# Patient Record
Sex: FEMALE | Race: WHITE | Employment: UNEMPLOYED | URBAN - METROPOLITAN AREA
[De-identification: names, ages, dates, MRNs, and addresses within clinical notes are randomized per-mention and may not be internally consistent; named-entity substitution may affect disease eponyms.]

---

## 2020-08-26 ENCOUNTER — HOSPITAL ENCOUNTER (OUTPATIENT)
Dept: PREADMISSION TESTING | Age: 70
Discharge: HOME | End: 2020-08-26
Payer: MEDICARE

## 2020-08-26 DIAGNOSIS — Z01.812 PRE-PROCEDURE LAB EXAM: ICD-10-CM

## 2020-08-26 PROCEDURE — 87635 SARS-COV-2 COVID-19 AMP PRB: CPT

## 2020-08-28 LAB — SARS-COV-2, COV2NT: NOT DETECTED

## 2020-08-31 ENCOUNTER — HOSPITAL ENCOUNTER (OUTPATIENT)
Age: 70
Setting detail: OUTPATIENT SURGERY
Discharge: HOME | End: 2020-08-31
Attending: INTERNAL MEDICINE | Admitting: INTERNAL MEDICINE
Payer: MEDICARE

## 2020-08-31 ENCOUNTER — ANESTHESIA (OUTPATIENT)
Dept: ENDOSCOPY | Age: 70
End: 2020-08-31
Payer: MEDICARE

## 2020-08-31 ENCOUNTER — ANESTHESIA EVENT (OUTPATIENT)
Dept: ENDOSCOPY | Age: 70
End: 2020-08-31
Payer: MEDICARE

## 2020-08-31 VITALS
BODY MASS INDEX: 26.29 KG/M2 | WEIGHT: 154 LBS | RESPIRATION RATE: 17 BRPM | HEART RATE: 62 BPM | DIASTOLIC BLOOD PRESSURE: 59 MMHG | HEIGHT: 64 IN | SYSTOLIC BLOOD PRESSURE: 139 MMHG | TEMPERATURE: 98.4 F | OXYGEN SATURATION: 100 %

## 2020-08-31 PROCEDURE — 77030013992 HC SNR POLYP ENDOSC BSC -B: Performed by: INTERNAL MEDICINE

## 2020-08-31 PROCEDURE — 76060000032 HC ANESTHESIA 0.5 TO 1 HR: Performed by: INTERNAL MEDICINE

## 2020-08-31 PROCEDURE — 77030019988 HC FCPS ENDOSC DISP BSC -B: Performed by: INTERNAL MEDICINE

## 2020-08-31 PROCEDURE — 76040000007: Performed by: INTERNAL MEDICINE

## 2020-08-31 PROCEDURE — 74011250636 HC RX REV CODE- 250/636: Performed by: INTERNAL MEDICINE

## 2020-08-31 PROCEDURE — 77030003657 HC NDL SCLER BSC -B: Performed by: INTERNAL MEDICINE

## 2020-08-31 PROCEDURE — 74011250636 HC RX REV CODE- 250/636: Performed by: NURSE ANESTHETIST, CERTIFIED REGISTERED

## 2020-08-31 PROCEDURE — 77030010936 HC CLP LIG BSC -C: Performed by: INTERNAL MEDICINE

## 2020-08-31 PROCEDURE — 88305 TISSUE EXAM BY PATHOLOGIST: CPT

## 2020-08-31 PROCEDURE — 74011000250 HC RX REV CODE- 250: Performed by: NURSE ANESTHETIST, CERTIFIED REGISTERED

## 2020-08-31 RX ORDER — SODIUM CHLORIDE 9 MG/ML
150 INJECTION, SOLUTION INTRAVENOUS CONTINUOUS
Status: DISCONTINUED | OUTPATIENT
Start: 2020-08-31 | End: 2020-08-31 | Stop reason: HOSPADM

## 2020-08-31 RX ORDER — DEXTROMETHORPHAN/PSEUDOEPHED 2.5-7.5/.8
1.2 DROPS ORAL
Status: DISCONTINUED | OUTPATIENT
Start: 2020-08-31 | End: 2020-08-31 | Stop reason: HOSPADM

## 2020-08-31 RX ORDER — SODIUM CHLORIDE 9 MG/ML
INJECTION, SOLUTION INTRAVENOUS
Status: DISCONTINUED | OUTPATIENT
Start: 2020-08-31 | End: 2020-08-31 | Stop reason: HOSPADM

## 2020-08-31 RX ORDER — LIDOCAINE HYDROCHLORIDE 20 MG/ML
INJECTION, SOLUTION EPIDURAL; INFILTRATION; INTRACAUDAL; PERINEURAL AS NEEDED
Status: DISCONTINUED | OUTPATIENT
Start: 2020-08-31 | End: 2020-08-31 | Stop reason: HOSPADM

## 2020-08-31 RX ORDER — FENTANYL CITRATE 50 UG/ML
200 INJECTION, SOLUTION INTRAMUSCULAR; INTRAVENOUS
Status: DISCONTINUED | OUTPATIENT
Start: 2020-08-31 | End: 2020-08-31 | Stop reason: HOSPADM

## 2020-08-31 RX ORDER — EPINEPHRINE 0.1 MG/ML
1 INJECTION INTRACARDIAC; INTRAVENOUS
Status: DISCONTINUED | OUTPATIENT
Start: 2020-08-31 | End: 2020-08-31 | Stop reason: HOSPADM

## 2020-08-31 RX ORDER — MIDAZOLAM HYDROCHLORIDE 1 MG/ML
.25-5 INJECTION, SOLUTION INTRAMUSCULAR; INTRAVENOUS
Status: DISCONTINUED | OUTPATIENT
Start: 2020-08-31 | End: 2020-08-31 | Stop reason: HOSPADM

## 2020-08-31 RX ORDER — ATROPINE SULFATE 0.1 MG/ML
0.5 INJECTION INTRAVENOUS
Status: DISCONTINUED | OUTPATIENT
Start: 2020-08-31 | End: 2020-08-31 | Stop reason: HOSPADM

## 2020-08-31 RX ORDER — SODIUM CHLORIDE 0.9 % (FLUSH) 0.9 %
5-40 SYRINGE (ML) INJECTION AS NEEDED
Status: DISCONTINUED | OUTPATIENT
Start: 2020-08-31 | End: 2020-08-31 | Stop reason: HOSPADM

## 2020-08-31 RX ORDER — SODIUM CHLORIDE 0.9 % (FLUSH) 0.9 %
5-40 SYRINGE (ML) INJECTION EVERY 8 HOURS
Status: DISCONTINUED | OUTPATIENT
Start: 2020-08-31 | End: 2020-08-31 | Stop reason: HOSPADM

## 2020-08-31 RX ORDER — PROPOFOL 10 MG/ML
INJECTION, EMULSION INTRAVENOUS AS NEEDED
Status: DISCONTINUED | OUTPATIENT
Start: 2020-08-31 | End: 2020-08-31 | Stop reason: HOSPADM

## 2020-08-31 RX ADMIN — MIDAZOLAM HYDROCHLORIDE 2 MG: 1 INJECTION, SOLUTION INTRAMUSCULAR; INTRAVENOUS at 08:02

## 2020-08-31 RX ADMIN — PROPOFOL 20 MG: 10 INJECTION, EMULSION INTRAVENOUS at 08:09

## 2020-08-31 RX ADMIN — PROPOFOL 50 MG: 10 INJECTION, EMULSION INTRAVENOUS at 07:42

## 2020-08-31 RX ADMIN — LIDOCAINE HYDROCHLORIDE 40 MG: 20 INJECTION, SOLUTION EPIDURAL; INFILTRATION; INTRACAUDAL; PERINEURAL at 07:38

## 2020-08-31 RX ADMIN — PROPOFOL 50 MG: 10 INJECTION, EMULSION INTRAVENOUS at 07:46

## 2020-08-31 RX ADMIN — SODIUM CHLORIDE: 900 INJECTION, SOLUTION INTRAVENOUS at 07:37

## 2020-08-31 RX ADMIN — PROPOFOL 100 MG: 10 INJECTION, EMULSION INTRAVENOUS at 07:39

## 2020-08-31 NOTE — ANESTHESIA PREPROCEDURE EVALUATION
Relevant Problems   No relevant active problems       Anesthetic History     PONV          Review of Systems / Medical History  Patient summary reviewed, nursing notes reviewed and pertinent labs reviewed    Pulmonary    COPD: moderate            Comments: O2 @ 2L    Neuro/Psych   Within defined limits           Cardiovascular  Within defined limits                Exercise tolerance: <4 METS     GI/Hepatic/Renal     GERD           Endo/Other    Diabetes    Arthritis and cancer     Other Findings              Physical Exam    Airway  Mallampati: II  TM Distance: > 6 cm  Neck ROM: normal range of motion   Mouth opening: Normal     Cardiovascular  Regular rate and rhythm,  S1 and S2 normal,  no murmur, click, rub, or gallop             Dental    Dentition: Full upper dentures and Lower partial plate     Pulmonary  Breath sounds clear to auscultation               Abdominal  GI exam deferred       Other Findings            Anesthetic Plan    ASA: 3  Anesthesia type: MAC            Anesthetic plan and risks discussed with: Patient

## 2020-08-31 NOTE — DISCHARGE INSTRUCTIONS
Zane Marcus 912 Luz Hilario M.D.  [address] [place] [address]  [phone]          [address] [facility]  [id number]  1950    DISCOMFORT:  Redness at IV site- apply warm compress to area; if redness or soreness persist- contact your physician  There may be a slight amount of blood passed from the rectum  Gaseous discomfort- walking, belching will help relieve any discomfort  You may not operate a vehicle for 12 hours  You may not engage in an occupation involving machinery or appliances for the  rest of today  You may not drink alcoholic beverages for at least 12 hours  Avoid making any critical decisions for at least 24 hours    DIET:   You may resume your normal diet, but some patients find that heavy or large  meals may lead to indigestion or vomiting. I suggest a light meal as first food  intake. I recommend a whole food, plant-based diet for your overall health. ACTIVITY:  You may resume your normal daily activities. It is recommended that you spend the remainder of the day resting - avoid any strenuous activity. CALL M.D. IF ANY SIGN OF:   Increasing pain, nausea, vomiting  Abdominal distension (swelling)  Significant bleeding (oral or rectal)  Fever   Pain in chest area  Shortness of breath    Additional Instructions:   Call Dr. Luz Hilario if any questions or problems at [phone]   You should receive the biopsy results by phone or mail within 3 weeks, if not, call  my office for the results      Colonoscopy showed multiple polyps on the R side of the colon, one was large. Will see what biopsies show to determine removal. No NSAIDs for 2 days. Learning About Coronavirus [phone])  Coronavirus [phone]): Overview  What is coronavirus (COVID-19)? The coronavirus disease (COVID-19) is caused by a virus. It is an illness that was first found in [place], [place], in December 2019. It has since spread worldwide.   The virus can cause fever, cough, and trouble breathing. In severe cases, it can cause pneumonia and make it hard to breathe without help. It can cause death. Coronaviruses are a large group of viruses. They cause the common cold. They also cause more serious illnesses like Middle East respiratory syndrome (MERS) and severe acute respiratory syndrome (SARS). COVID-19 is caused by a novel coronavirus. That means it's a new type that has not been seen in people before. This virus spreads person-to-person through droplets from coughing and sneezing. It can also spread when you are close to someone who is infected. And it can spread when you touch something that has the virus on it, such as a doorknob or a tabletop. What can you do to protect yourself from coronavirus (COVID-19)? The best way to protect yourself from getting sick is to:  · Avoid areas where there is an outbreak. · Avoid contact with people who may be infected. · Wash your hands often with soap or alcohol-based hand sanitizers. · Avoid crowds and try to stay at least 6 feet away from other people. · Wash your hands often, especially after you cough or sneeze. Use soap and water, and scrub for at least 20 seconds. If soap and water aren't available, use an alcohol-based hand . · Avoid touching your mouth, nose, and eyes. What can you do to avoid spreading the virus to others? To help avoid spreading the virus to others:  · Cover your mouth with a tissue when you cough or sneeze. Then throw the tissue in the trash. · Use a disinfectant to clean things that you touch often. · Stay home if you are sick or have been exposed to the virus. Don't go to school, work, or public areas. And don't use public transportation. · If you are sick:  ? Leave your home only if you need to get medical care. But call the doctor's office first so they know you're coming.  And wear a face mask, if you have one.  ? If you have a face mask, wear it whenever you're around other people. It can help stop the spread of the virus when you cough or sneeze. ? Clean and disinfect your home every day. Use household  and disinfectant wipes or sprays. Take special care to clean things that you grab with your hands. These include doorknobs, remote controls, phones, and handles on your refrigerator and microwave. And don't forget countertops, tabletops, bathrooms, and computer keyboards. When to call for help  Call 911 anytime you think you may need emergency care. For example, call if:  · You have severe trouble breathing. (You can't talk at all.)  · You have constant chest pain or pressure. · You are severely dizzy or lightheaded. · You are confused or can't think clearly. · Your face and lips have a blue color. · You pass out (lose consciousness) or are very hard to wake up. Call your doctor now if you develop symptoms such as:  · Shortness of breath. · Fever. · Cough. If you need to get care, call ahead to the doctor's office for instructions before you go. Make sure you wear a face mask, if you have one, to prevent exposing other people to the virus. Where can you get the latest information? The following health organizations are tracking and studying this virus. Their websites contain the most up-to-date information. Frankie Castanon also learn what to do if you think you may have been exposed to the virus. · U.S. Centers for Disease Control and Prevention (CDC): The CDC provides updated news about the disease and travel advice. The website also tells you how to prevent the spread of infection. www.cdc.gov  · World Health Organization [facility]): WHO offers information about the virus outbreaks. WHO also has travel advice. www.who.int  Current as of: April 1, 2020               Content Version: 12.4  © 5214-3541 Healthwise, Incorporated.    Care instructions adapted under license by your healthcare professional. If you have questions about a medical condition or this instruction, always ask your healthcare professional. [place] [address] any warranty or liability for your use of this information. Patient Education        Colon Polyps: Care Instructions  Your Care Instructions     Colon polyps are growths in the colon or the rectum. The cause of most colon polyps is not known, and most people who get them do not have any problems. But a certain kind can turn into cancer. For this reason, regular testing for colon polyps is important for people as they get older. It is also important for anyone who has an increased risk for colon cancer. Polyps are usually found through routine colon cancer screening tests. Although most colon polyps are not cancerous, they are usually removed and then tested for cancer. Screening for colon cancer saves lives because the cancer can usually be cured if it is caught early. If you have a polyp that is the type that can turn into cancer, you may need more tests to examine your entire colon. The doctor will remove any other polyps that he or she finds, and you will be tested more often. Follow-up care is a key part of your treatment and safety. Be sure to make and go to all appointments, and call your doctor if you are having problems. It's also a good idea to know your test results and keep a list of the medicines you take. How can you care for yourself at home? Regular exams to look for colon polyps are the best way to prevent polyps from turning into colon cancer. These can include stool tests, sigmoidoscopy, colonoscopy, and CT colonography. Talk with your doctor about a testing schedule that is right for you. To prevent polyps  There is no home treatment that can prevent colon polyps. But these steps may help lower your risk for cancer. · Stay active. Being active can help you get to and stay at a healthy weight. Try to exercise on most days of the week. Walking is a good choice. · Eat well.  Choose a variety of vegetables, fruits, legumes (such as peas and beans), fish, poultry, and whole grains. · Do not smoke. If you need help quitting, talk to your doctor about stop-smoking programs and medicines. These can increase your chances of quitting for good. · If you drink alcohol, limit how much you drink. Limit alcohol to 2 drinks a day for men and 1 drink a day for women. When should you call for help? Call your doctor now or seek immediate medical care if:  · You have severe belly pain. · Your stools are maroon or very bloody. Watch closely for changes in your health, and be sure to contact your doctor if:  · You have a fever. · You have nausea or vomiting. · You have a change in bowel habits (new constipation or diarrhea). · Your symptoms get worse or are not improving as expected. Where can you learn more? Go to http://toñito-patricia.info/  Enter C571 in the search box to learn more about \"Colon Polyps: Care Instructions. \"  Current as of: August 22, 2019               Content Version: 12.5  © 1435-2982 Healthwise, Incorporated. Care instructions adapted under license by [employer] (which disclaims liability or warranty for this information). If you have questions about a medical condition or this instruction, always ask your healthcare professional. [address] any warranty or liability for your use of this information.

## 2020-08-31 NOTE — PROGRESS NOTES

## 2020-08-31 NOTE — H&P
[address], [address] [place] [address]          Pre-procedure History and Physical       NAME:  Kirstin Machuca   :   1950   MRN:   [id number]     CHIEF COMPLAINT/HPI: See procedure note    PMH:  Past Medical History:   Diagnosis Date    Arthritis     Asthma     Breast cancer, stage 2 ([facility] 2011    Cancer ([facility] 2004    BREAST CA left no sticks/bp in left arm    Chronic fatigue 2011    Chronic kidney disease     HEMATURIA    Chronic pain     JOINT PAIN AND LEFT CHEST PAIN    Chronic pain     possible fibromyalgia    Diabetes ([facility]     Dyspnea 2011    GERD (gastroesophageal reflux disease)     High cholesterol     Lupus ([facility] 2011    Nausea & vomiting     Neuropathy 2011    PMR (polymyalgia rheumatica) (HCC)     S/P chemotherapy, time since greater than 12 weeks     S/P radiation therapy > 12 wks ago     Unspecified adverse effect of anesthesia     SEVERE HYPERTENSION DURING ORAL SURGERY    Use of letrozole ([facility])     Vitamin d deficiency        PSH:  Past Surgical History:   Procedure Laterality Date    HX AMPUTATION      RIGHT INDEX FINGER    HX CHOLECYSTECTOMY      HX GI      COLONSOCPY/EGD    HX MASTECTOMY      LEFT AND LYMPH NODES    HX MODIFIED RADICAL MASTECTOMY  04    left, w/ sentinel, Dr. Yaneli Robles HX RENAL BIOPSY      HX UROLOGICAL      CYSTO/STENTS    UT LAP,LYMPH NODE BX  04    left axilla       Allergies:   Allergies   Allergen Reactions    Latex Other (comments)    Contrast Agent [Iodine] Anaphylaxis     X-ray dye  Pt states not allergy    Codeine Unknown (comments)    Demerol [Meperidine] Unknown (comments)    Januvia [Sitagliptin] Other (comments)     Severe chest pain    Phenergan [Promethazine] Anxiety    Talwin Compound Unknown (comments)       Home Medications:  Prior to Admission Medications   Prescriptions Last Dose Informant Patient Reported? Taking? CALCIUM CARBONATE (CALTRATE 600 PO) 8/30/2020 at Unknown time  Yes Yes   Sig: Take 1 Tab by mouth daily. LACTOBACILLUS ACIDOPHILUS (ACIDOPHOLUS PO) 8/30/2020 at Unknown time  Yes Yes   Sig: Take  by mouth nightly. LEXAPRO 20 mg tablet 8/30/2020 at Unknown time  Yes Yes   Sig: 10 mg nightly. PROAIR HFA 90 mcg/Actuation inhaler Unknown at Unknown time  Yes No   albuterol-ipratropium (DUONEB) 0.5 mg-3 mg(2.5 mg base)/3 mL nebulizer solution 8/30/2020 at Unknown time  Yes Yes   Sig: 3 mL by Nebulization route two (2) times daily as needed. esomeprazole (NEXIUM) 40 mg capsule Not Taking at Unknown time  Yes No   Sig: Take  by mouth daily. fentanyl (DURAGESIC) 75 mcg/hr Not Taking at Unknown time  Yes No   gabapentin enacarbil (HORIZANT) 600 mg Tb24 8/30/2020 at Unknown time  Yes Yes   Sig: Take 1 Tab by mouth nightly. metformin (GLUCOPHAGE) 500 mg tablet Not Taking at Unknown time  Yes No   Sig: Take 500 mg by mouth three (3) times daily (with meals). omega-3 fatty acids-vitamin e (FISH OIL) 1,000 mg cap Not Taking at Unknown time  Yes No   Sig: Take 1 Cap by mouth daily. sucralfate (CARAFATE) 100 mg/mL suspension Not Taking at Unknown time  Yes No   Sig: Take 2 tsp by mouth four (4) times daily. Indications: esophagitis   torsemide (DEMADEX) 10 mg tablet Not Taking at Unknown time  Yes No   Sig: Take 10 mg by mouth daily. Facility-Administered Medications: None       Hospital Medications:  No current facility-administered medications for this encounter.         Family History:  Family History   Problem Relation Age of Onset    Alcohol abuse Mother     Cancer Mother         LUNG CA, BREAST CA    Lung Disease Mother     Alcohol abuse Father     Cancer Father         STOMACH CA       Social History:  Social History     Tobacco Use    Smoking status: Current Every Day Smoker     Packs/day: 1.50     Years: 34.00     Pack years: 51.00   Substance Use Topics    Alcohol use: No       The patient was counseled at length about the risks of vaishali Covid-19 in the jackie-operative and post-operative states including the recovery window of their procedure. The patient was made aware that vaishali Covid-19 after a surgical procedure may worsen their prognosis for recovering from the virus and lend to a higher morbidity and or mortality risk. The patient was given the options of postponing their procedure. All of the risks, benefits, and alternatives were discussed. The patient does  wish to proceed with the procedure. PHYSICAL EXAM PRIOR TO SEDATION:  General: Alert, in no acute distress    Lungs:            CTA bilaterally  Heart:  Normal S1, S2    Abdomen: Soft, Non distended, Non tender. Normoactive bowel sounds. Assessment:   Stable for sedation administration.   Date of last colonoscopy: 2005, Polyps  No    Plan:     · Endoscopic procedure with sedation     Signed By: Madelyn Collado MD     8/31/2020  7:36 AM

## 2020-08-31 NOTE — ROUTINE PROCESS
Prasanth Najera 1950 
[id number] Situation: 
Verbal report received from: Mich Lerma Procedure: Procedure(s): 
COLONOSCOPY   :- 
COLON BIOPSY ENDOSCOPIC POLYPECTOMY RESOLUTION CLIP INJECTION Background: 
 
Preoperative diagnosis: ABNORMAL CT SCAN Postoperative diagnosis: 1. Internal Hemorrhoids 2. Colon Polyps :  Dr. Jhonny Young Assistant(s): Endoscopy Technician-1: Kwadwo Davis 
Endoscopy RN-1: Justina Bradley RN Specimens:  
ID Type Source Tests Collected by Time Destination 1 : Cecal Biopsy Preservative   Dolly Gonzales MD 8/31/2020 3285 Pathology 2 : Cecal Polyps Preservative   Dolly Gonzales MD 8/31/2020 1893 Pathology 3 : Ascending Colon Mass Biopsy Preservative   Dolly Gonzales MD 8/31/2020 5978 Pathology 4 : Ascending Colon Polyps Preservative   Dolly Gonzales MD 8/31/2020 4092 Pathology 5 : Transverse Colon Polyp Preservative   Dolly Gonzales MD 8/31/2020 1562 Pathology H. Pylori  no Assessment: 
Intra-procedure medications Anesthesia gave intra-procedure sedation and medications, see anesthesia flow sheet yes Intravenous fluids: NS@ [facility] Vital signs stable yes Abdominal assessment: round and soft yes Recommendation: 
Discharge patient per MD order yes . Return to floor na Family or Friend  fam 
Permission to share finding with family or friend yes

## 2020-08-31 NOTE — PROCEDURES
Zane Richardson 912 Xiomy Rubalcava M.D.  Angelica Cunha, [address]  [phone]               Colonoscopy Procedure Note    NAME: Bert Alex  :  1950  MRN:  [id number]    Indications: Abnormal CT scan, colon    : Dustin Lam MD    Referring Provider:  Rigo, MD Emilie    Surgical Assistant: none    Prosthetic devices, grafts, tissues, transplant, or devices implanted: none    Medicines:  MAC anesthesia      Procedure Details:  After informed consent was obtained with all risks and benefits of the procedure explained and preprocedure exam completed, the patient was placed in the left lateral decubitus position. Universal protocol for patient identification was performed and documented in the nursing notes. Throughout the procedure, the patient's blood pressure was monitored at least every five minutes; pulse, and oxygen saturations were monitored continuously. All vital signs were documented in the nursing notes. A digital rectal exam was performed and was normal.  The Olympus videocolonoscope  was inserted in the rectum and carefully advanced to the terminal ileum. The colonoscope was slowly withdrawn with careful evaluation between folds. Retroflexion in the rectum was performed; findings and interventions are described below. Procedure start time, extent reached time/cecum time, and procedure end time are documented in the nursing notes. The quality of preparation was good. Findings:   Rectum: Grade small internal hemorrhoid(s);   Sigmoid: normal  Descending Colon: normal  Transverse Colon: 5 mm sessile polyp s/p cold snare polypectomy  Ascending Colon: two diminutive polyps s/p cold snare polypectomy; one 15 mm pedunculated polyp s/p hot snare polypectomy and placement of 2 hemoclips; one 25 mm sessile polyp in the proximal ascending colon s/p biopsies and makayla ink tattoo on the contralateral side  Cecum: two sessile polyps with greatest diameter of 4 mm s/p cold forceps polypectomy; biopsies taken of the normal appearing mucosa  Terminal Ileum: normal    Interventions:    6 complete polypectomy were performed using hot snare  /cold snare/cold forceps and the polyps were  retrieved    Specimens:   ID Type Source Tests Collected by Time Destination   1 : Cecal Biopsy Preservative   Adalgisa Cevallos MD 8/31/2020 5725 Pathology   2 : Cecal Polyps Preservative   Adalgisa Cevallos MD 8/31/2020 3585 Pathology   3 : Ascending Colon Mass Biopsy Preservative   Adalgisa Cevallos MD 8/31/2020 0759 Pathology   4 : Ascending Colon Polyps Preservative   Adalgisa Cevallos MD 8/31/2020 0750 Pathology   5 : Transverse Colon Polyp Preservative   Adalgisa Cevallos MD 8/31/2020 0764 Pathology       EBL:  None. Complications:   No immediate complications     Impression:  -See post-procedure diagnoses. Recommendations:   -Await pathology. If < 10 years, reason:  above average risk patient    -Based on pathology of ascending colon lesion, will determine need for EMR  NO NSAIDs for 2 days       Signed by:  Brianna Delaney MD          8/31/2020  8:22 AM

## 2020-08-31 NOTE — ANESTHESIA POSTPROCEDURE EVALUATION
Post-Anesthesia Evaluation and Assessment    Patient: Jamie Benoit MRN: [id number]  SSN: [id number]    YOB: 1950  Age: 72 y.o. Sex: female       Cardiovascular Function/Vital Signs  Visit Vitals  /57   Pulse (!) 59   Temp 36.9 °C (98.4 °F)   Resp 24   Ht 5' 4\" (1.626 m)   Wt 69.9 kg (154 lb)   SpO2 100%   Breastfeeding No   BMI 26.43 kg/m²       Patient is status post MAC anesthesia for Procedure(s):  COLONOSCOPY   :-  COLON BIOPSY  ENDOSCOPIC POLYPECTOMY  RESOLUTION CLIP  INJECTION. Nausea/Vomiting: None    Postoperative hydration reviewed and adequate. Pain:  Pain Scale 1: Numeric (0 - 10) (08/31/20 0839)  Pain Intensity 1: 0 (08/31/20 0839)   Managed    Neurological Status: At baseline    Mental Status and Level of Consciousness: Alert and oriented to person, place, and time    Pulmonary Status:   O2 Device: Nasal cannula (08/31/20 0839)   Adequate oxygenation and airway patent    Complications related to anesthesia: None    Post-anesthesia assessment completed. No concerns    Signed By: Sol Schofield MD     August 31, 2020              Procedure(s):  COLONOSCOPY   :-  COLON BIOPSY  ENDOSCOPIC POLYPECTOMY  RESOLUTION CLIP  INJECTION. MAC    <BSHSIANPOST>    INITIAL Post-op Vital signs:   Vitals Value Taken Time   /57 8/31/2020  8:39 AM   Temp     Pulse 62 8/31/2020  8:42 AM   Resp 19 8/31/2020  8:42 AM   SpO2 100 % 8/31/2020  8:42 AM   Vitals shown include unvalidated device data.

## 2020-10-15 ENCOUNTER — HOSPITAL ENCOUNTER (OUTPATIENT)
Age: 70
Setting detail: OUTPATIENT SURGERY
Discharge: HOME | End: 2020-10-15
Attending: INTERNAL MEDICINE | Admitting: INTERNAL MEDICINE
Payer: MEDICARE

## 2020-10-15 ENCOUNTER — ANESTHESIA EVENT (OUTPATIENT)
Dept: ENDOSCOPY | Age: 70
End: 2020-10-15
Payer: MEDICARE

## 2020-10-15 ENCOUNTER — ANESTHESIA (OUTPATIENT)
Dept: ENDOSCOPY | Age: 70
End: 2020-10-15
Payer: MEDICARE

## 2020-10-15 VITALS
SYSTOLIC BLOOD PRESSURE: 106 MMHG | HEIGHT: 64 IN | RESPIRATION RATE: 20 BRPM | TEMPERATURE: 98.4 F | OXYGEN SATURATION: 98 % | BODY MASS INDEX: 25.78 KG/M2 | WEIGHT: 151 LBS | DIASTOLIC BLOOD PRESSURE: 54 MMHG | HEART RATE: 62 BPM

## 2020-10-15 PROCEDURE — 74011250636 HC RX REV CODE- 250/636: Performed by: NURSE ANESTHETIST, CERTIFIED REGISTERED

## 2020-10-15 PROCEDURE — 77030040362: Performed by: INTERNAL MEDICINE

## 2020-10-15 PROCEDURE — 76040000019: Performed by: INTERNAL MEDICINE

## 2020-10-15 PROCEDURE — 76060000031 HC ANESTHESIA FIRST 0.5 HR: Performed by: INTERNAL MEDICINE

## 2020-10-15 PROCEDURE — 74011250637 HC RX REV CODE- 250/637: Performed by: INTERNAL MEDICINE

## 2020-10-15 PROCEDURE — 2709999900 HC NON-CHARGEABLE SUPPLY: Performed by: INTERNAL MEDICINE

## 2020-10-15 PROCEDURE — 74011000250 HC RX REV CODE- 250: Performed by: NURSE ANESTHETIST, CERTIFIED REGISTERED

## 2020-10-15 PROCEDURE — 77030040684 HC NDL ENDOSC NEEDLEMASTR OCOA -B: Performed by: INTERNAL MEDICINE

## 2020-10-15 RX ORDER — SODIUM CHLORIDE 0.9 % (FLUSH) 0.9 %
5-40 SYRINGE (ML) INJECTION AS NEEDED
Status: DISCONTINUED | OUTPATIENT
Start: 2020-10-15 | End: 2020-10-15 | Stop reason: HOSPADM

## 2020-10-15 RX ORDER — FLUMAZENIL 0.1 MG/ML
0.2 INJECTION INTRAVENOUS
Status: DISCONTINUED | OUTPATIENT
Start: 2020-10-15 | End: 2020-10-15 | Stop reason: HOSPADM

## 2020-10-15 RX ORDER — ATROPINE SULFATE 0.1 MG/ML
0.5 INJECTION INTRAVENOUS
Status: DISCONTINUED | OUTPATIENT
Start: 2020-10-15 | End: 2020-10-15 | Stop reason: HOSPADM

## 2020-10-15 RX ORDER — EPINEPHRINE 0.1 MG/ML
1 INJECTION INTRACARDIAC; INTRAVENOUS
Status: DISCONTINUED | OUTPATIENT
Start: 2020-10-15 | End: 2020-10-15 | Stop reason: HOSPADM

## 2020-10-15 RX ORDER — LIDOCAINE HYDROCHLORIDE 20 MG/ML
INJECTION, SOLUTION EPIDURAL; INFILTRATION; INTRACAUDAL; PERINEURAL AS NEEDED
Status: DISCONTINUED | OUTPATIENT
Start: 2020-10-15 | End: 2020-10-15 | Stop reason: HOSPADM

## 2020-10-15 RX ORDER — DEXTROMETHORPHAN/PSEUDOEPHED 2.5-7.5/.8
1.2 DROPS ORAL
Status: DISCONTINUED | OUTPATIENT
Start: 2020-10-15 | End: 2020-10-15 | Stop reason: HOSPADM

## 2020-10-15 RX ORDER — PHENOL/SODIUM PHENOLATE
20 AEROSOL, SPRAY (ML) MUCOUS MEMBRANE DAILY
COMMUNITY
End: 2020-11-09

## 2020-10-15 RX ORDER — NALOXONE HYDROCHLORIDE 0.4 MG/ML
0.4 INJECTION, SOLUTION INTRAMUSCULAR; INTRAVENOUS; SUBCUTANEOUS
Status: DISCONTINUED | OUTPATIENT
Start: 2020-10-15 | End: 2020-10-15 | Stop reason: HOSPADM

## 2020-10-15 RX ORDER — SODIUM CHLORIDE 0.9 % (FLUSH) 0.9 %
5-40 SYRINGE (ML) INJECTION EVERY 8 HOURS
Status: DISCONTINUED | OUTPATIENT
Start: 2020-10-15 | End: 2020-10-15 | Stop reason: HOSPADM

## 2020-10-15 RX ORDER — SODIUM CHLORIDE 9 MG/ML
50 INJECTION, SOLUTION INTRAVENOUS CONTINUOUS
Status: DISCONTINUED | OUTPATIENT
Start: 2020-10-15 | End: 2020-10-15 | Stop reason: HOSPADM

## 2020-10-15 RX ORDER — MIDAZOLAM HYDROCHLORIDE 1 MG/ML
.25-5 INJECTION, SOLUTION INTRAMUSCULAR; INTRAVENOUS
Status: DISCONTINUED | OUTPATIENT
Start: 2020-10-15 | End: 2020-10-15 | Stop reason: HOSPADM

## 2020-10-15 RX ORDER — FENTANYL CITRATE 50 UG/ML
25-200 INJECTION, SOLUTION INTRAMUSCULAR; INTRAVENOUS
Status: DISCONTINUED | OUTPATIENT
Start: 2020-10-15 | End: 2020-10-15 | Stop reason: HOSPADM

## 2020-10-15 RX ORDER — PROPOFOL 10 MG/ML
INJECTION, EMULSION INTRAVENOUS AS NEEDED
Status: DISCONTINUED | OUTPATIENT
Start: 2020-10-15 | End: 2020-10-15 | Stop reason: HOSPADM

## 2020-10-15 RX ORDER — SODIUM CHLORIDE 9 MG/ML
INJECTION, SOLUTION INTRAVENOUS
Status: DISCONTINUED | OUTPATIENT
Start: 2020-10-15 | End: 2020-10-15 | Stop reason: HOSPADM

## 2020-10-15 RX ADMIN — PROPOFOL 50 MG: 10 INJECTION, EMULSION INTRAVENOUS at 12:21

## 2020-10-15 RX ADMIN — LIDOCAINE HYDROCHLORIDE 40 MG: 20 INJECTION, SOLUTION EPIDURAL; INFILTRATION; INTRACAUDAL; PERINEURAL at 12:15

## 2020-10-15 RX ADMIN — PROPOFOL 30 MG: 10 INJECTION, EMULSION INTRAVENOUS at 12:25

## 2020-10-15 RX ADMIN — PROPOFOL 50 MG: 10 INJECTION, EMULSION INTRAVENOUS at 12:15

## 2020-10-15 RX ADMIN — SODIUM CHLORIDE: 900 INJECTION, SOLUTION INTRAVENOUS at 11:50

## 2020-10-15 RX ADMIN — PROPOFOL 50 MG: 10 INJECTION, EMULSION INTRAVENOUS at 12:17

## 2020-10-15 NOTE — H&P
[address]  174 [facility], [address] [place] [address]      History and Physical       NAME:  Yasmin Wills   :   1950   MRN:   [id number]             History of Present Illness:  Patient is a 72 y.o. who is seen for ascending colon polyp and removal with EMR. PMH:  Past Medical History:   Diagnosis Date    Arthritis     Asthma     Breast cancer, stage 2 ([facility] 2011    Cancer ([facility] 2004    BREAST CA left no sticks/bp in left arm    Chronic fatigue 2011    Chronic kidney disease     HEMATURIA    Chronic pain     JOINT PAIN AND LEFT CHEST PAIN    Chronic pain     possible fibromyalgia    [facility]     Dyspnea 2011    GERD (gastroesophageal reflux disease)     High cholesterol     Lupus [facility] 2011    Nausea & vomiting     Neuropathy 2011    PMR (polymyalgia rheumatica) (HCC)     S/P chemotherapy, time since greater than 12 weeks     S/P radiation therapy > 12 wks ago     Unspecified adverse effect of anesthesia     SEVERE HYPERTENSION DURING ORAL SURGERY    Use of letrozole (Femara)     Vitamin D deficiency        PSH:  Past Surgical History:   Procedure Laterality Date    COLONOSCOPY N/A 2020    COLONOSCOPY   :- performed by Anatoliy Rosario [facility] at [facility] ENDOSCOPY    HX AMPUTATION      RIGHT INDEX FINGER    HX CHOLECYSTECTOMY      HX GI      COLONSOCPY/EGD    HX MASTECTOMY      LEFT AND LYMPH NODES    HX MODIFIED RADICAL MASTECTOMY  04    left, w/ sentinel, Dr. Dany Callahan    HX RENAL BIOPSY      HX UROLOGICAL      CYSTO/STENTS    NE LAP,LYMPH NODE BX  04    left axilla       Allergies:   Allergies   Allergen Reactions    Latex Other (comments)    Contrast Agent [Iodine] Anaphylaxis     X-ray dye  Pt states not allergy    Codeine Unknown (comments)    Demerol [Meperidine] Unknown (comments)    Januvia [Sitagliptin] Other (comments)     Severe chest pain    Phenergan [Promethazine] Anxiety    Talwin Compound Unknown (comments)       Home Medications:  Prior to Admission Medications   Prescriptions Last Dose Informant Patient Reported? Taking? CALCIUM CARBONATE (CALTRATE 600 PO) Not Taking at Unknown time  Yes No   Sig: Take 1 Tab by mouth daily. LACTOBACILLUS ACIDOPHILUS (ACIDOPHOLUS PO) Not Taking at Unknown time  Yes No   Sig: Take  by mouth nightly. LEXAPRO 20 mg tablet 10/8/2020 at Unknown time  Yes Yes   Sig: 10 mg nightly. Omeprazole delayed release (PRILOSEC D/R) 20 mg tablet 10/8/2020 at Unknown time  Yes Yes   Sig: Take 20 mg by mouth daily. PROAIR HFA 90 mcg/Actuation inhaler 10/14/2020 at Unknown time  Yes Yes   albuterol-ipratropium (DUONEB) 0.5 mg-3 mg(2.5 mg base)/3 mL nebulizer solution 9/15/2020 at Unknown time  Yes Yes   Sig: 3 mL by Nebulization route two (2) times daily as needed. budesonide/formoterol fumarate (SYMBICORT IN) 10/15/2020 at Unknown time  Yes Yes   Sig: Take  by inhalation. esomeprazole (NEXIUM) 40 mg capsule Not Taking at Unknown time  Yes No   Sig: Take  by mouth daily. fentanyl (DURAGESIC) 75 mcg/hr Not Taking at Unknown time  Yes No   gabapentin enacarbil (HORIZANT) 600 mg Tb24 10/8/2020 at Unknown time  Yes Yes   Sig: Take 1 Tab by mouth nightly. metformin (GLUCOPHAGE) 500 mg tablet Not Taking at Unknown time  Yes No   Sig: Take 500 mg by mouth three (3) times daily (with meals). omega-3 fatty acids-vitamin e (FISH OIL) 1,000 mg cap Not Taking at Unknown time  Yes No   Sig: Take 1 Cap by mouth daily. sucralfate (CARAFATE) 100 mg/mL suspension Not Taking at Unknown time  Yes No   Sig: Take 2 tsp by mouth four (4) times daily. Indications: esophagitis   torsemide (DEMADEX) 10 mg tablet Not Taking at Unknown time  Yes No   Sig: Take 10 mg by mouth daily. umeclidinium bromide (INCRUSE ELLIPTA IN) 10/15/2020 at Unknown time  Yes Yes   Sig: Take  by inhalation.       Facility-Administered Medications: None       Hospital Medications:  Current Facility-Administered Medications   Medication Dose Route Frequency    0.9% sodium chloride infusion  50 mL/hr IntraVENous CONTINUOUS    sodium chloride (NS) flush 5-40 mL  5-40 mL IntraVENous Q8H    sodium chloride (NS) flush 5-40 mL  5-40 mL IntraVENous PRN    midazolam (VERSED) injection 0.25-5 mg  0.25-5 mg IntraVENous Multiple    fentaNYL citrate (PF) injection  mcg   mcg IntraVENous Multiple    naloxone (NARCAN) injection 0.4 mg  0.4 mg IntraVENous Multiple    flumazeniL (ROMAZICON) 0.1 mg/mL injection 0.2 mg  0.2 mg IntraVENous Multiple    simethicone (MYLICON) 17QL/8.5QC oral drops 80 mg  1.2 mL Oral Multiple    atropine injection 0.5 mg  0.5 mg IntraVENous ONCE PRN    EPINEPHrine (ADRENALIN) 0.1 mg/mL syringe 1 mg  1 mg Endoscopically ONCE PRN       Social History:  Social History     Tobacco Use    Smoking status: Current Every Day Smoker     Packs/day: 1.50     Years: 34.00     Pack years: 51.00   Substance Use Topics    Alcohol use: No       Family History:  Family History   Problem Relation Age of Onset    Alcohol abuse Mother     Cancer Mother         LUNG CA, BREAST CA    Lung Disease Mother     Alcohol abuse Father     Cancer Father         STOMACH CA             Review of Systems:      Constitutional: negative fever, negative chills, negative weight loss  Eyes:   negative visual changes  ENT:   negative sore throat, tongue or lip swelling  Respiratory:  negative cough, negative dyspnea  Cards:  negative for chest pain, palpitations, lower extremity edema  GI:   See HPI  :  negative for frequency, dysuria  Integument:  negative for rash and pruritus  Heme:  negative for easy bruising and gum/nose bleeding  Musculoskel: negative for myalgias,  back pain and muscle weakness  Neuro: negative for headaches, dizziness, vertigo  Psych:  negative for feelings of anxiety, depression       Objective:     Patient Vitals for the past 8 hrs:   BP Temp Pulse Resp SpO2 Height Weight   10/15/20 1104 (!) 114/54 98.8 °F (37.1 °C) 69 20 99 % 5' 4\" (1.626 m) 68.5 kg (151 lb)     No intake/output data recorded. No intake/output data recorded. EXAM:     NEURO-a&o   HEENT-wnl   LUNGS-clear    COR-regular rate and rhythym     ABD-soft , no tenderness, no rebound, good bs     EXT-no edema     Data Review     No results for input(s): WBC, HGB, HCT, PLT, HGBEXT, HCTEXT, PLTEXT in the last 72 hours. No results for input(s): NA, K, CL, CO2, BUN, CREA, GLU, PHOS, CA in the last 72 hours. No results for input(s): AP, TBIL, TP, ALB, GLOB, GGT, AML, LPSE in the last 72 hours. No lab exists for component: SGOT, GPT, AMYP, HLPSE  No results for input(s): INR, PTP, APTT, INREXT in the last 72 hours. Assessment:     · Ascending colon polyp     Patient Active Problem List   Diagnosis Code    [facility] C50.919    Neuropathy G62.9    Chronic fatigue R53.82    Dyspnea R06.00    Lupus ([facility] M32.9    Post-op pain G89.18     Plan:   · The patient was counseled at length about the risks of vaishali Covid-19 in the jackie-operative and post-operative states including the recovery window of their procedure. The patient was made aware that vaishali Covid-19 after a surgical procedure may worsen their prognosis for recovering from the virus and lend to a higher morbidity and or mortality risk. The patient was given the options of postponing their procedure. All of the risks, benefits, and alternatives were discussed. The patient does wish to proceed with the procedure. · Endoscopic procedure with MAC     Signed By: Chiara Wagner.  Emir Herbert MD     10/15/2020  12:05 PM

## 2020-10-15 NOTE — ROUTINE PROCESS
Yasmin [facility] 1950 
[id number] Situation: 
Verbal report received from: DANA Rogers Procedure: Procedure(s): 
COLONOSCOPY with EMR INJECTION Background: 
 
Preoperative diagnosis: Colon polyp Postoperative diagnosis: Ascending Colon Polyp :  Dr. Ekaterina Cochran Assistant(s): Endoscopy Technician-1: Chana Rahman Endoscopy RN-1: Crissy Cortes RN Specimens: * No specimens in log * H. Pylori  no Assessment: 
Intra-procedure medications Anesthesia gave intra-procedure sedation and medications, see anesthesia flow sheet yes Intravenous fluids: NS@ Marissa Clore Vital signs stable Abdominal assessment: round and soft Recommendation: 
Discharge patient per MD order. Family or Friend Permission to share finding with family or friend yes

## 2020-10-15 NOTE — PROCEDURES
26[address] [facility]  174 [facility], [address] [place] [address]      Colonoscopy Operative Report    Finn Steward  [id number]  1950      Procedure Type:   Colonoscopy with tattoo placement     Indications:  Ascending colon polyp        Pre-operative Diagnosis: see indication above    Post-operative Diagnosis:  See findings below    :  Fransico Narayanan. Dagmar Graff MD      Referring Provider: MIREYA Gong MD    Sedation:  MAC anesthesia Propofol      Procedure Details:  After informed consent was obtained with all risks and benefits of procedure explained and preoperative exam completed, the patient was taken to the endoscopy suite and placed in the left lateral decubitus position. Upon sequential sedation as per above, a digital rectal exam was performed demonstrating internal hemorrhoids. The Olympus pediatric videocolonoscope  was inserted in the rectum and carefully advanced to the cecum, which was identified by the ileocecal valve and appendiceal orifice. The cecum was identified by the ileocecal valve and appendiceal orifice. The quality of preparation was good. The colonoscope was slowly withdrawn with careful evaluation between folds. Retroflexion in the rectum was completed . Findings:   The previously described ascending colon polyp was identified. Attempts were made to lift the polyp with ORISE gel lifting agent. There was clearly a centrally depressed region that would not lift. For this reason, EMR was deferred today. Additional submucosal tattoo was placed distal to the polyp. The polyp had been previously biopsied. Specimen Removed: none    Complications: None. EBL:  None. Impression:    1. Ascending colon polyp - could not be removed with EMR as described above    Recommendations:  1. Referral to surgery  2. Will discuss findings with Dr. Arti Brantley By: Fransico Narayanan.  Dagmar Graff MD     10/15/2020  12:34 PM

## 2020-10-15 NOTE — DISCHARGE INSTRUCTIONS
[address] [facility]    COLON DISCHARGE INSTRUCTIONS    Alston Libman  [id number]  1950    Discomfort:  Redness at IV site- apply warm compress to area; if redness or soreness persist- contact your physician  There may be a slight amount of blood passed from the rectum  Gaseous discomfort- walking, belching will help relieve any discomfort  You may not operate a vehicle for 12 hours  You may not engage in an occupation involving machinery or appliances for rest of today  You may not drink alcoholic beverages for at least 12 hours  Avoid making any critical decisions for at least 24 hour  DIET:  You may resume your regular diet - however -  remember your colon is empty and a heavy meal will produce gas. Avoid these foods:  vegetables, fried / greasy foods, carbonated drinks     ACTIVITY:  You may  resume your normal daily activities it is recommended that you spend the remainder of the day resting -  avoid any strenuous activity. CALL M.D. ANY SIGN OF:   Increasing pain, nausea, vomiting  Abdominal distension (swelling)  New increased bleeding (oral or rectal)  Fever (chills)  Pain in chest area  Bloody discharge from nose or mouth  Shortness of breath      Follow-up Instructions:   Call Dr. Zuleima Wahl for any questions or problems at [phone]          ENDOSCOPY FINDINGS:   Your colonoscopy showed a large polyp in the colon as previously described by Dr. Kristopher Stephen. This could not be removed through the scope today and will require surgery to remove. I will discuss these findings with Dr. Kristopher Stephen and he will set you up to see a surgeon. Telephone # [phone]      Signed By: Conrad Karimi.  Jess Lee MD     10/15/2020  12:33 PM       DISCHARGE SUMMARY from Nurse    The following personal items collected during your admission are returned to you:   Dental Appliance: Dental Appliances: Uppers, Lowers  Vision: Visual Aid: Glasses  Hearing Aid:    Anais Specking: Clothing:    Other Valuables:    Valuables sent to safe:      Learning About Coronavirus (COVID-19)  Coronavirus [phone]): Overview  What is coronavirus (COVID-19)? The coronavirus disease (COVID-19) is caused by a virus. It is an illness that was first found in [place], [place], in December 2019. It has since spread worldwide. The virus can cause fever, cough, and trouble breathing. In severe cases, it can cause pneumonia and make it hard to breathe without help. It can cause death. Coronaviruses are a large group of viruses. They cause the common cold. They also cause more serious illnesses like Middle East respiratory syndrome (MERS) and severe acute respiratory syndrome (SARS). COVID-19 is caused by a novel coronavirus. That means it's a new type that has not been seen in people before. This virus spreads person-to-person through droplets from coughing and sneezing. It can also spread when you are close to someone who is infected. And it can spread when you touch something that has the virus on it, such as a doorknob or a tabletop. What can you do to protect yourself from coronavirus (COVID-19)? The best way to protect yourself from getting sick is to:  · Avoid areas where there is an outbreak. · Avoid contact with people who may be infected. · Wash your hands often with soap or alcohol-based hand sanitizers. · Avoid crowds and try to stay at least 6 feet away from other people. · Wash your hands often, especially after you cough or sneeze. Use soap and water, and scrub for at least 20 seconds. If soap and water aren't available, use an alcohol-based hand . · Avoid touching your mouth, nose, and eyes. What can you do to avoid spreading the virus to others? To help avoid spreading the virus to others:  · Cover your mouth with a tissue when you cough or sneeze. Then throw the tissue in the trash. · Use a disinfectant to clean things that you touch often.   · Stay home if you are sick or have been exposed to the virus. Don't go to school, work, or public areas. And don't use public transportation. · If you are sick:  ? Leave your home only if you need to get medical care. But call the doctor's office first so they know you're coming. And wear a face mask, if you have one.  ? If you have a face mask, wear it whenever you're around other people. It can help stop the spread of the virus when you cough or sneeze. ? Clean and disinfect your home every day. Use household  and disinfectant wipes or sprays. Take special care to clean things that you grab with your hands. These include doorknobs, remote controls, phones, and handles on your refrigerator and microwave. And don't forget countertops, tabletops, bathrooms, and computer keyboards. When to call for help  Call 911 anytime you think you may need emergency care. For example, call if:  · You have severe trouble breathing. (You can't talk at all.)  · You have constant chest pain or pressure. · You are severely dizzy or lightheaded. · You are confused or can't think clearly. · Your face and lips have a blue color. · You pass out (lose consciousness) or are very hard to wake up. Call your doctor now if you develop symptoms such as:  · Shortness of breath. · Fever. · Cough. If you need to get care, call ahead to the doctor's office for instructions before you go. Make sure you wear a face mask, if you have one, to prevent exposing other people to the virus. Where can you get the latest information? The following health organizations are tracking and studying this virus. Their websites contain the most up-to-date information. Mulugeta Peoples also learn what to do if you think you may have been exposed to the virus. · U.S. Centers for Disease Control and Prevention (CDC): The CDC provides updated news about the disease and travel advice. The website also tells you how to prevent the spread of infection.  www.cdc.gov  · World Health Organization [facility]): WHO offers information about the virus outbreaks. WHO also has travel advice. www.who.int  Current as of: April 1, 2020               Content Version: 12.4  © 2297-2785 [employer], [employer]orporated. Care instructions adapted under license by your healthcare professional. If you have questions about a medical condition or this instruction, always ask your healthcare professional. [address] any warranty or liability for your use of this information.

## 2020-10-15 NOTE — ANESTHESIA POSTPROCEDURE EVALUATION
Post-Anesthesia Evaluation and Assessment    Patient: Fatemeh Monk MRN: [id number]  SSN: [id number]    YOB: 1950  Age: 72 y.o. Sex: female      I have evaluated the patient and they are stable and ready for discharge from the PACU. Cardiovascular Function/Vital Signs  Visit Vitals  BP (!) 104/57   Pulse 65   Temp 37.1 °C (98.8 °F)   Resp 20   Ht 5' 4\" (1.626 m)   Wt 68.5 kg (151 lb)   SpO2 97%   BMI 25.92 kg/m²       Patient is status post MAC anesthesia for Procedure(s):  COLONOSCOPY with EMR  INJECTION. Nausea/Vomiting: None    Postoperative hydration reviewed and adequate. Pain:  Pain Scale 1: Numeric (0 - 10) (10/15/20 1239)  Pain Intensity 1: 0 (10/15/20 1239)   Managed    Neurological Status: At baseline    Mental Status, Level of Consciousness: Alert and  oriented to person, place, and time    Pulmonary Status:   O2 Device: Room air (10/15/20 1239)   Adequate oxygenation and airway patent    Complications related to anesthesia: None    Post-anesthesia assessment completed. No concerns    Signed By: Mahi Elliott MD     October 15, 2020              Procedure(s):  COLONOSCOPY with EMR  INJECTION. MAC    <BSHSIANPOST>    INITIAL Post-op Vital signs:   Vitals Value Taken Time   BP 0/0 10/15/2020 12:45 PM   Temp     Pulse 65 10/15/2020 12:45 PM   Resp 28 10/15/2020 12:45 PM   SpO2 97 % 10/15/2020 12:45 PM   Vitals shown include unvalidated device data.

## 2020-10-15 NOTE — PERIOP NOTES

## 2020-11-02 ENCOUNTER — TRANSCRIBE ORDER (OUTPATIENT)
Dept: GENERAL RADIOLOGY | Age: 70
End: 2020-11-02

## 2020-11-02 ENCOUNTER — HOSPITAL ENCOUNTER (OUTPATIENT)
Dept: GENERAL RADIOLOGY | Age: 70
Discharge: HOME | End: 2020-11-02
Attending: INTERNAL MEDICINE
Payer: MEDICARE

## 2020-11-02 DIAGNOSIS — C34.90 LUNG CANCER (HCC): Primary | ICD-10-CM

## 2020-11-02 DIAGNOSIS — C34.90 LUNG CANCER (HCC): ICD-10-CM

## 2020-11-02 PROCEDURE — 71101 X-RAY EXAM UNILAT RIBS/CHEST: CPT

## 2020-11-04 ENCOUNTER — OFFICE VISIT (OUTPATIENT)
Dept: SURGERY | Age: 70
End: 2020-11-04
Payer: MEDICARE

## 2020-11-04 VITALS
RESPIRATION RATE: 20 BRPM | BODY MASS INDEX: 26.46 KG/M2 | DIASTOLIC BLOOD PRESSURE: 55 MMHG | TEMPERATURE: 99.3 F | HEART RATE: 67 BPM | HEIGHT: 64 IN | OXYGEN SATURATION: 97 % | WEIGHT: 155 LBS | SYSTOLIC BLOOD PRESSURE: 111 MMHG

## 2020-11-04 DIAGNOSIS — K63.89 MASS OF COLON: Primary | ICD-10-CM

## 2020-11-04 PROBLEM — C34.12 MALIGNANT NEOPLASM OF UPPER LOBE OF LEFT LUNG (HCC): Status: ACTIVE | Noted: 2020-11-04

## 2020-11-04 PROBLEM — K21.9 GASTROESOPHAGEAL REFLUX DISEASE WITHOUT ESOPHAGITIS: Status: ACTIVE | Noted: 2020-11-04

## 2020-11-04 PROBLEM — E11.9 CONTROLLED TYPE 2 DIABETES MELLITUS WITHOUT COMPLICATION, WITHOUT LONG-TERM CURRENT USE OF INSULIN (HCC): Status: ACTIVE | Noted: 2020-11-04

## 2020-11-04 PROBLEM — J41.0 SIMPLE CHRONIC BRONCHITIS (HCC): Status: ACTIVE | Noted: 2020-11-04

## 2020-11-04 PROBLEM — I10 ESSENTIAL HYPERTENSION: Status: ACTIVE | Noted: 2020-11-04

## 2020-11-04 PROCEDURE — 3017F COLORECTAL CA SCREEN DOC REV: CPT | Performed by: SURGERY

## 2020-11-04 PROCEDURE — G8510 SCR DEP NEG, NO PLAN REQD: HCPCS | Performed by: SURGERY

## 2020-11-04 PROCEDURE — G8427 DOCREV CUR MEDS BY ELIG CLIN: HCPCS | Performed by: SURGERY

## 2020-11-04 PROCEDURE — 99203 OFFICE O/P NEW LOW 30 MIN: CPT | Performed by: SURGERY

## 2020-11-04 PROCEDURE — 3288F FALL RISK ASSESSMENT DOCD: CPT | Performed by: SURGERY

## 2020-11-04 PROCEDURE — G8400 PT W/DXA NO RESULTS DOC: HCPCS | Performed by: SURGERY

## 2020-11-04 PROCEDURE — 1090F PRES/ABSN URINE INCON ASSESS: CPT | Performed by: SURGERY

## 2020-11-04 PROCEDURE — G8419 CALC BMI OUT NRM PARAM NOF/U: HCPCS | Performed by: SURGERY

## 2020-11-04 PROCEDURE — G8536 NO DOC ELDER MAL SCRN: HCPCS | Performed by: SURGERY

## 2020-11-04 PROCEDURE — 1100F PTFALLS ASSESS-DOCD GE2>/YR: CPT | Performed by: SURGERY

## 2020-11-04 RX ORDER — NEOMYCIN SULFATE 500 MG/1
TABLET ORAL
Qty: 6 TAB | Refills: 0 | Status: SHIPPED | OUTPATIENT
Start: 2020-11-04 | End: 2020-12-03

## 2020-11-04 RX ORDER — SODIUM PICOSULFATE, MAGNESIUM OXIDE, AND ANHYDROUS CITRIC ACID 10; 3.5; 12 MG/160ML; G/160ML; G/160ML
1 LIQUID ORAL 2 TIMES DAILY
Qty: 2 BOTTLE | Refills: 0 | Status: SHIPPED | OUTPATIENT
Start: 2020-11-04 | End: 2020-12-03

## 2020-11-04 RX ORDER — METRONIDAZOLE 500 MG/1
TABLET ORAL
Qty: 3 TAB | Refills: 0 | Status: SHIPPED | OUTPATIENT
Start: 2020-11-04 | End: 2020-12-03

## 2020-11-04 RX ORDER — METOCLOPRAMIDE 10 MG/1
TABLET ORAL
Qty: 3 TAB | Refills: 0 | Status: SHIPPED | OUTPATIENT
Start: 2020-11-04 | End: 2020-12-03

## 2020-11-04 RX ORDER — LISINOPRIL 10 MG/1
TABLET ORAL DAILY
COMMUNITY
End: 2020-11-09

## 2020-11-04 NOTE — PROGRESS NOTES
1. Have you been to the ER, urgent care clinic since your last visit? Hospitalized since your last visit? new patient    2. Have you seen or consulted any other health care providers outside of the [address] [place] [address] since your last visit? Include any pap smears or colon screening.  New patient

## 2020-11-05 NOTE — PATIENT INSTRUCTIONS
Laparoscopic Bowel Resection: Before Your Surgery What is a laparoscopic bowel resection? Laparoscopic bowel resection is a type of surgery. It removes a part of your intestine. It uses very small cuts, called incisions. To do this surgery, a doctor puts a lighted tube through incisions in your belly. This tube is called a scope. It lets your doctor see your organs. Next, he or she puts special tools through the tube to take out part of your intestine. Then the doctor puts the healthy parts of your intestine back together. You may have this surgery if your intestine is damaged or blocked. Crohn's disease and cancer can cause these kinds of problems. Diverticulitis can also cause them. You will probably stay in the hospital for about 2 to 5 days. You may be able to do your normal activities in 2 to 4 weeks. Follow-up care is a key part of your treatment and safety. Be sure to make and go to all appointments, and call your doctor if you are having problems. It's also a good idea to know your test results and keep a list of the medicines you take. How do you prepare for surgery? Surgery can be stressful. This information will help you understand what you can expect. And it will help you safely prepare for surgery. Preparing for surgery 
  · You may need to take antibiotics before surgery.  
  · A day or two before surgery, your doctor may have you stop eating and have you drink only clear liquids.  
  · You may take laxatives to clean out your bowels. You also may take an enema. Your doctor will tell you how to do this.  
  · Be sure you have someone to take you home. Anesthesia and pain medicine will make it unsafe for you to drive or get home on your own.  
  · Understand exactly what surgery is planned, along with the risks, benefits, and other options.  
  · If you take aspirin or some other blood thinner, ask your doctor if you should stop taking it before your surgery.  Make sure that you understand exactly what your doctor wants you to do. These medicines increase the risk of bleeding.  
  · Tell your doctor ALL the medicines, vitamins, supplements, and herbal remedies you take. Some may increase the risk of problems during your surgery. Your doctor will tell you if you should stop taking any of them before the surgery and how soon to do it.  
  · Make sure your doctor and the hospital have a copy of your advance directive. If you don't have one, you may want to prepare one. It lets others know your health care wishes. It's a good thing to have before any type of surgery or procedure. What happens on the day of surgery? · Follow the instructions exactly about when to stop eating and drinking. If you don't, your surgery may be canceled. If your doctor told you to take your medicines on the day of surgery, take them with only a sip of water.  
  · Take a bath or shower before you come in for your surgery. Do not apply lotions, perfumes, deodorants, or nail polish.  
  · Do not shave the surgical site yourself.  
  · Take off all jewelry and piercings. And take out contact lenses, if you wear them. At the hospital or surgery center · Bring a picture ID.  
  · The area for surgery is often marked to make sure there are no errors.  
  · You will be kept comfortable and safe by your anesthesia provider. You will be asleep during the surgery.  
  · The surgery will take 2 to 4 hours. When should you call your doctor? · You have questions or concerns.  
  · You don't understand how to prepare for your surgery.  
  · You become ill before the surgery (such as fever, flu, or a cold).  
  · You need to reschedule or have changed your mind about having the surgery. Where can you learn more? Go to http://www.gray.com/ Enter D389 in the search box to learn more about \"Laparoscopic Bowel Resection: Before Your Surgery. \" 
 Current as of: April 15, 2020               Content Version: 12.6 © 3386-2938 [employer]. Care instructions adapted under license by [employer] (which disclaims liability or warranty for this information). If you have questions about a medical condition or this instruction, always ask your healthcare professional. [address] any warranty or liability for your use of this information.

## 2020-11-06 NOTE — PROGRESS NOTES
Surgery Consult    Subjective:      Toña Lou is a 72 y.o. female who is being seen for evaluation of ascending colon polyp. She recently underwent colonoscopy, which revealed a seemingly unresectable ascending colon polyp. She underwent a second attempt through a alternate technique, with technical limitations and the morphology of the polyp precluded safe resection. Therefore, the procedure was terminated and she was referred to surgery for evaluation. She has a history of breast cancer and lung cancer, both managed through mastectomy and left upper lobe resection, respectively. From an oncology standpoint, she is without evidence of disease. She has a history of COPD, with nocturnal oxygen requirement and intermittent daytime oxygen requirement. 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 She denies abdominal pain, weight loss, bone pain, fever, chills, change in bowel habits, or blood per rectum.     Patient Active Problem List    Diagnosis Date Noted    Simple chronic bronchitis ([address] 11/04/2020    Malignant neoplasm of upper lobe of left lung ([address] 11/04/2020    Gastroesophageal reflux disease without esophagitis 11/04/2020    Controlled type 2 diabetes mellitus without complication, without long-term current use of insulin ([address] 11/04/2020    Essential hypertension 11/04/2020    Mass of colon, ascending colon 11/04/2020    Post-op pain 02/07/2011    Breast cancer, stage 2 ([address] 01/25/2011    Neuropathy 01/25/2011    Chronic fatigue 01/25/2011    Dyspnea 01/25/2011    Lupus ([address] 01/25/2011     Past Medical History:   Diagnosis Date    Arthritis     Asthma     Breast cancer, stage 2 ([address] 1/25/2011    Cancer ([address] 2004    BREAST CA left no sticks/bp in left arm    Chronic fatigue 1/25/2011    Chronic kidney disease     HEMATURIA    Chronic pain     JOINT PAIN AND LEFT CHEST PAIN    Chronic pain     possible fibromyalgia    Diabetes ([facility]     Dyspnea 1/25/2011    GERD (gastroesophageal reflux disease)     High cholesterol     Lupus ([facility] 1/25/2011    Nausea & vomiting     Neuropathy 1/25/2011    PMR (polymyalgia rheumatica) (HCC)     S/P chemotherapy, time since greater than 12 weeks     S/P radiation therapy > 12 wks ago     Unspecified adverse effect of anesthesia 2010    SEVERE HYPERTENSION DURING ORAL SURGERY    Use of letrozole (Femara)     Vitamin D deficiency       Past Surgical History:   Procedure Laterality Date    COLONOSCOPY N/A 8/31/2020    COLONOSCOPY   :- performed by Ángel Stein MD at [facility] ENDOSCOPY    COLONOSCOPY N/A 10/15/2020    COLONOSCOPY with EMR performed by Elijah Doherty MD at [facility] ENDOSCOPY    HX AMPUTATION      RIGHT INDEX FINGER    HX CHOLECYSTECTOMY      HX GI      COLONSOCPY/EGD    HX MASTECTOMY      LEFT AND LYMPH NODES    HX MODIFIED RADICAL MASTECTOMY  9-28-04    left, w/ sentinel, Dr. Andrea Bliss    HX RENAL BIOPSY      HX UROLOGICAL      CYSTO/STENTS    [place] LAP,LYMPH NODE BX  9-28-04    left axilla      Social History     Tobacco Use    Smoking status: Current Every Day Smoker     Packs/day: 1.50     Years: 34.00     Pack years: 51.00    Smokeless tobacco: Never Used   Substance Use Topics    Alcohol use: No      Family History   Problem Relation Age of Onset    Alcohol abuse Mother     Cancer Mother         LUNG CA, BREAST CA    Lung Disease Mother     Alcohol abuse Father     Cancer Father         STOMACH CA      Current Outpatient Medications   Medication Sig    lisinopriL (PRINIVIL, ZESTRIL) 10 mg tablet Take  by mouth daily.  Oxygen 2.5 liters at night and 2 liters prn during the day    sod picosulf-mag ox-citric ac (Clenpiq) 10 mg-3.5 gram -12 gram/160 mL soln Take 1 Bottle by mouth two (2) times a day.  Drink 1 bottle on day before surgery at 12 PM and 3 PM    metoclopramide HCl (REGLAN) 10 mg tablet Take 1 tablet by mouth at 6 AM, 12 PM, and 5 PM on day before surgery    neomycin (MYCIFRADIN) 500 mg tablet Take 2 tablets at 8 AM, 9 AM, and 5 PM on day before procedure    metroNIDAZOLE (FLAGYL) 500 mg tablet Take 1 tablet at 8 AM, 9 AM, and 5 PM on day before procedure    Omeprazole delayed release (PRILOSEC D/R) 20 mg tablet Take 20 mg by mouth daily.  budesonide/formoterol fumarate (SYMBICORT IN) Take  by inhalation.  umeclidinium bromide (INCRUSE ELLIPTA IN) Take  by inhalation.  gabapentin enacarbil (HORIZANT) 600 mg Tb24 Take 1 Tab by mouth nightly.  albuterol-ipratropium (DUONEB) 0.5 mg-3 mg(2.5 mg base)/3 mL nebulizer solution 3 mL by Nebulization route two (2) times daily as needed.  LEXAPRO 20 mg tablet 10 mg nightly.  PROAIR HFA 90 mcg/Actuation inhaler     omega-3 fatty acids-vitamin e (FISH OIL) 1,000 mg cap Take 1 Cap by mouth daily.  esomeprazole (NEXIUM) 40 mg capsule Take  by mouth daily.  sucralfate (CARAFATE) 100 mg/mL suspension Take 2 tsp by mouth four (4) times daily. Indications: esophagitis    LACTOBACILLUS ACIDOPHILUS (ACIDOPHOLUS PO) Take  by mouth nightly.  CALCIUM CARBONATE (CALTRATE 600 PO) Take 1 Tab by mouth daily.  fentanyl (DURAGESIC) 75 mcg/hr     metformin (GLUCOPHAGE) 500 mg tablet Take 500 mg by mouth three (3) times daily (with meals).  torsemide (DEMADEX) 10 mg tablet Take 10 mg by mouth daily. No current facility-administered medications for this visit. Allergies   Allergen Reactions    Latex Other (comments)    Contrast Agent [Iodine] Anaphylaxis     X-ray dye  Pt states not allergy    Codeine Unknown (comments)    Demerol [Meperidine] Unknown (comments)    Januvia [Sitagliptin] Other (comments)     Severe chest pain    Phenergan [Promethazine] Anxiety    Talwin Compound Unknown (comments)       Review of Systems:    A complete review of systems is negative except as noted in HPI.     Objective: Visit Vitals  BP (!) 111/55   Pulse 67   Temp 99.3 °F (37.4 °C)   Resp 20   Ht 5' 4\" (1.626 m)   Wt 70.3 kg (155 lb)   SpO2 97%   BMI 26.61 kg/m²       Physical Exam:  GENERAL: alert, cooperative, no distress, appears older than stated age, EYE: negative, LYMPH NODES: No cervical or supraclavicular adenopathy. THROAT & NECK: normal, LUNG: clear to auscultation bilaterally, HEART: regular rate and rhythm, S1, S2 normal, no murmur. ABDOMEN: NABS, nondistended, soft. Well-healed right upper quadrant Kocher incision, left upper quadrant nephrostomy incision. No pain with palpation, mass, or hernia. EXTREMITIES:  extremities normal, atraumatic, no cyanosis or edema, SKIN: Healed bilateral mastectomy scars. NEUROLOGIC: negative    Lab/Data Review:  Endoscopy reports/findings reviewed. Assessment:     Unresectable ascending colon polyp. Central umbilication at polyp precluded safe excision. She was referred for laparoscopic resection. She has history of breast cancer and lung cancer but is without evidence of disease. She has COPD but despite oxygen requirement appears quite functional with activities of daily life. Plan:     1. I recommend proceeding with laparoscopic right colectomy. 2. Discussed aspects of surgical intervention, methods, risks (including by not limited to infection, bleeding, hematoma, open procedure, malignant findings, injury to surrounding structures, and perforation of the intestines or solid organs), and the risks of general anesthetic. The patient understands the risks; all questions were answered to the patient's satisfaction. 3. Patient does wish to proceed with surgery. ERAS protocol reviewed with patient, handbook provided. Bowel prep, oral antibiotics forwarded to her pharmacy electronically.

## 2020-11-09 ENCOUNTER — HOSPITAL ENCOUNTER (OUTPATIENT)
Dept: GENERAL RADIOLOGY | Age: 70
Discharge: HOME | End: 2020-11-09
Attending: SURGERY
Payer: MEDICARE

## 2020-11-09 ENCOUNTER — HOSPITAL ENCOUNTER (OUTPATIENT)
Dept: PREADMISSION TESTING | Age: 70
Discharge: HOME | End: 2020-11-09
Payer: MEDICARE

## 2020-11-09 VITALS
DIASTOLIC BLOOD PRESSURE: 71 MMHG | TEMPERATURE: 98.4 F | HEIGHT: 65 IN | SYSTOLIC BLOOD PRESSURE: 117 MMHG | WEIGHT: 155.2 LBS | BODY MASS INDEX: 25.86 KG/M2 | HEART RATE: 77 BPM | RESPIRATION RATE: 18 BRPM

## 2020-11-09 LAB
ALBUMIN SERPL-MCNC: 3.6 G/DL (ref 3.5–5)
ALBUMIN/GLOB SERPL: 1 {RATIO} (ref 1.1–2.2)
ALP SERPL-CCNC: 88 U/L (ref 45–117)
ALT SERPL-CCNC: 20 U/L (ref 12–78)
ANION GAP SERPL CALC-SCNC: 8 MMOL/L (ref 5–15)
AST SERPL-CCNC: 16 U/L (ref 15–37)
BASOPHILS # BLD: 0.1 K/UL (ref 0–0.1)
BASOPHILS NFR BLD: 1 % (ref 0–1)
BILIRUB SERPL-MCNC: 0.3 MG/DL (ref 0.2–1)
BUN SERPL-MCNC: 15 MG/DL (ref 6–20)
BUN/CREAT SERPL: 13 (ref 12–20)
CALCIUM SERPL-MCNC: 9.4 MG/DL (ref 8.5–10.1)
CHLORIDE SERPL-SCNC: 106 MMOL/L (ref 97–108)
CO2 SERPL-SCNC: 25 MMOL/L (ref 21–32)
CREAT SERPL-MCNC: 1.19 MG/DL (ref 0.55–1.02)
DIFFERENTIAL METHOD BLD: ABNORMAL
EOSINOPHIL # BLD: 0.3 K/UL (ref 0–0.4)
EOSINOPHIL NFR BLD: 4 % (ref 0–7)
ERYTHROCYTE [DISTWIDTH] IN BLOOD BY AUTOMATED COUNT: 14.5 % (ref 11.5–14.5)
GLOBULIN SER CALC-MCNC: 3.5 G/DL (ref 2–4)
GLUCOSE SERPL-MCNC: 96 MG/DL (ref 65–100)
HCT VFR BLD AUTO: 35 % (ref 35–47)
HGB BLD-MCNC: 11.7 G/DL (ref 11.5–16)
IMM GRANULOCYTES # BLD AUTO: 0 K/UL (ref 0–0.04)
IMM GRANULOCYTES NFR BLD AUTO: 0 % (ref 0–0.5)
LYMPHOCYTES # BLD: 1.4 K/UL (ref 0.8–3.5)
LYMPHOCYTES NFR BLD: 20 % (ref 12–49)
MAGNESIUM SERPL-MCNC: 1.8 MG/DL (ref 1.6–2.4)
MCH RBC QN AUTO: 31 PG (ref 26–34)
MCHC RBC AUTO-ENTMCNC: 33.4 G/DL (ref 30–36.5)
MCV RBC AUTO: 92.8 FL (ref 80–99)
MONOCYTES # BLD: 0.7 K/UL (ref 0–1)
MONOCYTES NFR BLD: 10 % (ref 5–13)
NEUTS SEG # BLD: 4.6 K/UL (ref 1.8–8)
NEUTS SEG NFR BLD: 65 % (ref 32–75)
NRBC # BLD: 0 K/UL (ref 0–0.01)
NRBC BLD-RTO: 0 PER 100 WBC
PLATELET # BLD AUTO: 203 K/UL (ref 150–400)
PMV BLD AUTO: 11.6 FL (ref 8.9–12.9)
POTASSIUM SERPL-SCNC: 3.8 MMOL/L (ref 3.5–5.1)
PROT SERPL-MCNC: 7.1 G/DL (ref 6.4–8.2)
RBC # BLD AUTO: 3.77 M/UL (ref 3.8–5.2)
SODIUM SERPL-SCNC: 139 MMOL/L (ref 136–145)
WBC # BLD AUTO: 7.1 K/UL (ref 3.6–11)

## 2020-11-09 PROCEDURE — 71046 X-RAY EXAM CHEST 2 VIEWS: CPT

## 2020-11-09 PROCEDURE — 80053 COMPREHEN METABOLIC PANEL: CPT

## 2020-11-09 PROCEDURE — 85025 COMPLETE CBC W/AUTO DIFF WBC: CPT

## 2020-11-09 PROCEDURE — 83735 ASSAY OF MAGNESIUM: CPT

## 2020-11-09 PROCEDURE — 93005 ELECTROCARDIOGRAM TRACING: CPT

## 2020-11-09 RX ORDER — GUAIFENESIN 400 MG/1
TABLET ORAL
COMMUNITY

## 2020-11-09 RX ORDER — ESCITALOPRAM OXALATE 10 MG/1
10 TABLET ORAL DAILY
COMMUNITY

## 2020-11-09 RX ORDER — OMEPRAZOLE 40 MG/1
40 CAPSULE, DELAYED RELEASE ORAL
COMMUNITY

## 2020-11-09 RX ORDER — GABAPENTIN 300 MG/1
300 CAPSULE ORAL
COMMUNITY

## 2020-11-09 RX ORDER — LOVASTATIN 10 MG/1
10 TABLET ORAL
COMMUNITY

## 2020-11-09 NOTE — PERIOP NOTES
PAT Nurse Practitioner   Pre-Operative Chart Review/Assessment:-[facility] GENERAL/COLORECTAL SURGERY                 Patient Name:  Patti Metcalf                    MRN:   [id number]     Sex:   female                                       Age:   72 y.o.    :  1950       Today's Date:  11/10/2020     Date of PAT:   2020      Date of Surgery:    2020      Procedure(s):   Lap Right Colectomy     Surgeon:  Dr. Lutricia Dakins:       1)  PCP: Sheila Baker NP        2)  Cardiac Clearance: Not requested. 3)  Diabetic Treatment Consult: A1c not ordered. No hx of DM. 4)  Sleep Apnea evaluation:  Not indicated. PAUL Score 2.       5)  Treatment for MRSA/Staph Aureus: Not ordered. No hx of MRSA. 6)  Additional Concerns: METs<4, Current 1.5 PPD smoker, PONV, COPD, GERD, Lupus. Pt on home O2 at night and intermittently throughout the day.         7) Bowel Prep: Yes       8) Abx: Yes                   Vital Signs:         Vitals:    20 1601   BP: 117/71   Pulse: 77   Resp: 18   Temp: 98.4 °F (36.9 °C)   Weight: 70.4 kg (155 lb 3.3 oz)   Height: 5' 5\" (1.651 m)            ____________________________________________  PAST MEDICAL HISTORY  Past Medical History:   Diagnosis Date    Arthritis     OSTEO-ALL OVER    Asthma     [facility] 2 [facility] 2011    RIGHT    Cancer ([facility] 2004    BREAST CA left no sticks/bp in left arm    Chronic fatigue 2011    Chronic obstructive pulmonary disease (HCC)     Chronic pain     JOINT PAIN AND LEFT CHEST PAIN    Chronic pain     possible fibromyalgia    Dyspnea 2011    Emphysema lung (HCC)     GERD (gastroesophageal reflux disease)     Hematuria     High cholesterol     Lung cancer (HCC)     Lupus ([facility] 2011    Nausea & vomiting     Neuropathy 2011    PMR (polymyalgia rheumatica) (HCC)     S/P chemotherapy, time since greater than 12 weeks     S/P radiation therapy > 12 wks ago     Unspecified adverse effect of anesthesia 2010    SEVERE HYPERTENSION DURING ORAL SURGERY    Use of letrozole (Femara)     Vitamin D deficiency       ____________________________________________  PAST SURGICAL HISTORY  Past Surgical History:   Procedure Laterality Date    CHEST SURGERY PROCEDURE UNLISTED Left 2017    LEFT LOBECTOMY    COLONOSCOPY N/A 8/31/2020    COLONOSCOPY   :- performed by Balwinder Fernández MD at [id number] 10/15/2020    COLONOSCOPY with EMR performed by Y[facility]anda Kovacs [facility] at [facility] ENDOSCOPY    HX AMPUTATION Right     RIGHT INDEX FINGER    HX APPENDECTOMY      HX CHOLECYSTECTOMY      HX GI      COLONSOCPY/EGD    HX MASTECTOMY Bilateral     LEFT AND LYMPH NODES    HX MODIFIED RADICAL MASTECTOMY  9-28-04    left, w/ sentinel, Dr. Kaden Casper Right     RT ANKLE FX/HARDWARE AND REMOVAL HARDWARE    HX RENAL BIOPSY      HX UROLOGICAL      CYSTO/STENTS    [place] LAP,LYMPH NODE BX  9-28-04    left axilla      ____________________________________________  HOME MEDICATIONS  Current Outpatient Medications   Medication Sig    gabapentin (NEURONTIN) 300 mg capsule Take 300 mg by mouth nightly.  lovastatin (MEVACOR) 10 mg tablet Take 10 mg by mouth nightly.  omeprazole (PRILOSEC) 40 mg capsule Take 40 mg by mouth nightly.  escitalopram oxalate (LEXAPRO) 10 mg tablet Take 10 mg by mouth daily.  sodium chloride (SALINE MIST NA) by Nasal route daily as needed.  guaiFENesin (ORGANIDIN) 400 mg tablet Take  by mouth every six (6) hours as needed for Congestion.  sod picosulf-mag ox-citric ac (Clenpiq) 10 mg-3.5 gram -12 gram/160 mL soln Take 1 Bottle by mouth two (2) times a day.  Drink 1 bottle on day before surgery at 12 PM and 3 PM    metoclopramide HCl (REGLAN) 10 mg tablet Take 1 tablet by mouth at 6 AM, 12 PM, and 5 PM on day before surgery    neomycin (MYCIFRADIN) 500 mg tablet Take 2 tablets at 8 AM, 9 AM, and 5 PM on day before procedure    metroNIDAZOLE (FLAGYL) 500 mg tablet Take 1 tablet at 8 AM, 9 AM, and 5 PM on day before procedure    budesonide/formoterol fumarate (SYMBICORT IN) Take  by inhalation.  umeclidinium bromide (INCRUSE ELLIPTA IN) Take  by inhalation.  albuterol-ipratropium (DUONEB) 0.5 mg-3 mg(2.5 mg base)/3 mL nebulizer solution 3 mL by Nebulization route two (2) times daily as needed.  PROAIR HFA 90 mcg/Actuation inhaler     Oxygen 2.5 liters at night and 2 liters prn during the day    sucralfate (CARAFATE) 100 mg/mL suspension Take 2 tsp by mouth four (4) times daily.     Indications: esophagitis     No current facility-administered medications for this encounter.       ____________________________________________  ALLERGIES  Allergies   Allergen Reactions    Latex Other (comments)    Codeine Unknown (comments)    Demerol [Meperidine] Unknown (comments)    Januvia [Sitagliptin] Other (comments)     Severe chest pain    Phenergan [Promethazine] Anxiety    Talwin Compound Unknown (comments)      ____________________________________________  SOCIAL HISTORY  Social History     Tobacco Use    Smoking status: Current Every Day Smoker     Packs/day: 1.50     Years: 34.00     Pack years: 51.00    Smokeless tobacco: Never Used   Substance Use Topics    Alcohol use: No      ____________________________________________      Labs:    Hospital Outpatient Visit on 11/09/2020   Component Date Value Ref Range Status    WBC 11/09/2020 7.1  3.6 - 11.0 K/uL Final    RBC 11/09/2020 3.77* 3.80 - 5.20 M/uL Final    HGB 11/09/2020 11.7  11.5 - 16.0 g/dL Final    HCT 11/09/2020 35.0  35.0 - 47.0 % Final    MCV 11/09/2020 92.8  80.0 - 99.0 FL Final    MCH 11/09/2020 31.0  26.0 - 34.0 PG Final    MCHC 11/09/2020 33.4  30.0 - 36.5 g/dL Final    RDW 11/09/2020 14.5  11.5 - 14.5 % Final    PLATELET 86/08/8715 663  150 - 400 K/uL Final    MPV 11/09/2020 11.6  8.9 - 12.9 FL Final    NRBC 11/09/2020 0.0  0  WBC Final    ABSOLUTE NRBC 11/09/2020 0.00  0.00 - 0.01 K/uL Final    NEUTROPHILS 11/09/2020 65  32 - 75 % Final    LYMPHOCYTES 11/09/2020 20  12 - 49 % Final    MONOCYTES 11/09/2020 10  5 - 13 % Final    EOSINOPHILS 11/09/2020 4  0 - 7 % Final    BASOPHILS 11/09/2020 1  0 - 1 % Final    IMMATURE GRANULOCYTES 11/09/2020 0  0.0 - 0.5 % Final    ABS. NEUTROPHILS 11/09/2020 4.6  1.8 - 8.0 K/UL Final    ABS. LYMPHOCYTES 11/09/2020 1.4  0.8 - 3.5 K/UL Final    ABS. MONOCYTES 11/09/2020 0.7  0.0 - 1.0 K/UL Final    ABS. EOSINOPHILS 11/09/2020 0.3  0.0 - 0.4 K/UL Final    ABS. BASOPHILS 11/09/2020 0.1  0.0 - 0.1 K/UL Final    ABS. IMM. GRANS.  11/09/2020 0.0  0.00 - 0.04 K/UL Final    DF 11/09/2020 AUTOMATED    Final    Ventricular Rate 11/09/2020 68  BPM Final    Atrial Rate 11/09/2020 68  BPM Final    P-R Interval 11/09/2020 202  ms Final    QRS Duration 11/09/2020 90  ms Final    Q-T Interval 11/09/2020 424  ms Final    QTC Calculation (Bezet) 11/09/2020 450  ms Final    Calculated P Axis 11/09/2020 91  degrees Final    Calculated R Axis 11/09/2020 68  degrees Final    Calculated T Axis 11/09/2020 56  degrees Final    Diagnosis 11/09/2020    Final                    Value:Normal sinus rhythm  Normal ECG  When compared with ECG of 21-APR-2011 10:48,  No significant change was found  Confirmed by Maira Holden M.D., Shawn Mendiola ([id number]) on 11/10/2020 8:09:53 AM      Magnesium 11/09/2020 1.8  1.6 - 2.4 mg/dL Final    Sodium 11/09/2020 139  136 - 145 mmol/L Final    Potassium 11/09/2020 3.8  3.5 - 5.1 mmol/L Final    Chloride 11/09/2020 106  97 - 108 mmol/L Final    CO2 11/09/2020 25  21 - 32 mmol/L Final    Anion gap 11/09/2020 8  5 - 15 mmol/L Final    Glucose 11/09/2020 96  65 - 100 mg/dL Final    BUN 11/09/2020 15  6 - 20 MG/DL Final    Creatinine 11/09/2020 1.19* 0.55 - 1.02 MG/DL Final    BUN/Creatinine ratio 11/09/2020 13  12 - 20   Final    GFR est AA 11/09/2020 55* >60 ml/min/1.73m2 Final    GFR est non-AA 11/09/2020 45* >60 ml/min/1.73m2 Final    Estimated GFR is calculated using the IDMS-traceable Modification of Diet in Renal Disease (MDRD) Study equation, reported for both  Americans (GFRAA) and non- Americans (GFRNA), and normalized to 1.73m2 body surface area. The physician must decide which value applies to the patient.  Calcium 11/09/2020 9.4  8.5 - 10.1 MG/DL Final    Bilirubin, total 11/09/2020 0.3  0.2 - 1.0 MG/DL Final    ALT (SGPT) 11/09/2020 20  12 - 78 U/L Final    AST (SGOT) 11/09/2020 16  15 - 37 U/L Final    Alk. phosphatase 11/09/2020 88  45 - 117 U/L Final    Protein, total 11/09/2020 7.1  6.4 - 8.2 g/dL Final    Albumin 11/09/2020 3.6  3.5 - 5.0 g/dL Final    Globulin 11/09/2020 3.5  2.0 - 4.0 g/dL Final    A-G Ratio 11/09/2020 1.0* 1.1 - 2.2   Final          Skin:     Denies open wounds, cuts, sores, rashes or other areas of concern in PAT assessment.          Ana Norman NP

## 2020-11-09 NOTE — PERIOP NOTES
[facility] PROTOCOL REVIEWED PAGES 7-12 OF HANDBOOK. [place] Winter PRE-OP AND MEDICATION INSTRUCTIONS REVIEWED WITH PATIENT , INFORMATION GIVEN REGARDING SURGICAL SITE INFECTION AND INCENTIVE SPIROMETRY USE. INFORMATION GIVEN REGARDING COVID TESTING AND ARRIVAL TO HOSPITAL ON DAY OF SURGERY. TWO BOTTLES OF CHG SOAP GIVEN AND HOW TO USE SOAP. OPPORTUNITY GIVEN TO ASK QUESTIONS , VERBALIZED UNDERSTANDING OF INFORMATION DISCUSSED .

## 2020-11-10 LAB
ATRIAL RATE: 68 BPM
CALCULATED P AXIS, ECG09: 91 DEGREES
CALCULATED R AXIS, ECG10: 68 DEGREES
CALCULATED T AXIS, ECG11: 56 DEGREES
DIAGNOSIS, 93000: NORMAL
P-R INTERVAL, ECG05: 202 MS
Q-T INTERVAL, ECG07: 424 MS
QRS DURATION, ECG06: 90 MS
QTC CALCULATION (BEZET), ECG08: 450 MS
VENTRICULAR RATE, ECG03: 68 BPM

## 2020-11-12 ENCOUNTER — TRANSCRIBE ORDER (OUTPATIENT)
Dept: REGISTRATION | Age: 70
End: 2020-11-12

## 2020-11-12 ENCOUNTER — HOSPITAL ENCOUNTER (OUTPATIENT)
Dept: PREADMISSION TESTING | Age: 70
Discharge: HOME | End: 2020-11-12
Payer: MEDICARE

## 2020-11-12 DIAGNOSIS — Z01.812 PRE-PROCEDURE LAB EXAM: Primary | ICD-10-CM

## 2020-11-12 DIAGNOSIS — Z01.812 PRE-PROCEDURE LAB EXAM: ICD-10-CM

## 2020-11-12 PROCEDURE — 87635 SARS-COV-2 COVID-19 AMP PRB: CPT

## 2020-11-13 ENCOUNTER — ANESTHESIA EVENT (OUTPATIENT)
Dept: SURGERY | Age: 70
DRG: 330 | End: 2020-11-13
Payer: MEDICARE

## 2020-11-13 LAB — SARS-COV-2, COV2NT: NOT DETECTED

## 2020-11-16 ENCOUNTER — ANESTHESIA (OUTPATIENT)
Dept: SURGERY | Age: 70
DRG: 330 | End: 2020-11-16
Payer: MEDICARE

## 2020-11-16 ENCOUNTER — HOSPITAL ENCOUNTER (INPATIENT)
Age: 70
LOS: 6 days | Discharge: LEFT BEFORE DISCHARGE | DRG: 330 | End: 2020-11-22
Attending: SURGERY | Admitting: SURGERY
Payer: MEDICARE

## 2020-11-16 PROBLEM — K63.89 MASS OF HEPATIC FLEXURE OF COLON: Status: ACTIVE | Noted: 2020-11-16

## 2020-11-16 PROCEDURE — 94640 AIRWAY INHALATION TREATMENT: CPT

## 2020-11-16 PROCEDURE — 0DTF4ZZ RESECTION OF RIGHT LARGE INTESTINE, PERCUTANEOUS ENDOSCOPIC APPROACH: ICD-10-PCS | Performed by: SURGERY

## 2020-11-16 PROCEDURE — 74011000250 HC RX REV CODE- 250: Performed by: NURSE ANESTHETIST, CERTIFIED REGISTERED

## 2020-11-16 PROCEDURE — 77030008684 HC TU ET CUF COVD -B: Performed by: ANESTHESIOLOGY

## 2020-11-16 PROCEDURE — 77030034628 HC LIGASURE LAP SEAL DIV MD COVD -F: Performed by: SURGERY

## 2020-11-16 PROCEDURE — 77030037032 HC INSRT SCIS CLICKLLINE DISP STOR -B: Performed by: SURGERY

## 2020-11-16 PROCEDURE — 77030019702 HC WRP THER MENM -C: Performed by: SURGERY

## 2020-11-16 PROCEDURE — 74011250636 HC RX REV CODE- 250/636: Performed by: NURSE ANESTHETIST, CERTIFIED REGISTERED

## 2020-11-16 PROCEDURE — 74011250636 HC RX REV CODE- 250/636: Performed by: ANESTHESIOLOGY

## 2020-11-16 PROCEDURE — 2709999900 HC NON-CHARGEABLE SUPPLY: Performed by: SURGERY

## 2020-11-16 PROCEDURE — 77030012770 HC TRCR OPT FX AMR -B: Performed by: SURGERY

## 2020-11-16 PROCEDURE — 77030018836 HC SOL IRR NACL ICUM -A: Performed by: SURGERY

## 2020-11-16 PROCEDURE — 74011000250 HC RX REV CODE- 250: Performed by: SURGERY

## 2020-11-16 PROCEDURE — 51798 US URINE CAPACITY MEASURE: CPT

## 2020-11-16 PROCEDURE — 77030020829: Performed by: SURGERY

## 2020-11-16 PROCEDURE — 77030026438 HC STYL ET INTUB CARD -A: Performed by: ANESTHESIOLOGY

## 2020-11-16 PROCEDURE — 74011250636 HC RX REV CODE- 250/636: Performed by: SURGERY

## 2020-11-16 PROCEDURE — 74011000258 HC RX REV CODE- 258: Performed by: SURGERY

## 2020-11-16 PROCEDURE — 76010000131 HC OR TIME 2 TO 2.5 HR: Performed by: SURGERY

## 2020-11-16 PROCEDURE — 77030002966 HC SUT PDS J&J -A: Performed by: SURGERY

## 2020-11-16 PROCEDURE — 77030008462 HC STPLR SKN PROX J&J -A: Performed by: SURGERY

## 2020-11-16 PROCEDURE — 74011250636 HC RX REV CODE- 250/636: Performed by: NURSE PRACTITIONER

## 2020-11-16 PROCEDURE — 77030040922 HC BLNKT HYPOTHRM STRY -A

## 2020-11-16 PROCEDURE — 77030005513 HC CATH URETH FOL11 MDII -B: Performed by: SURGERY

## 2020-11-16 PROCEDURE — 77030009527 HC GEL PRT SYS AMR -E: Performed by: SURGERY

## 2020-11-16 PROCEDURE — 77030013079 HC BLNKT BAIR HGGR 3M -A: Performed by: ANESTHESIOLOGY

## 2020-11-16 PROCEDURE — 77030005513 HC CATH URETH FOL11 MDII -B

## 2020-11-16 PROCEDURE — 77030016151 HC PROTCTR LNS DFOG COVD -B: Performed by: SURGERY

## 2020-11-16 PROCEDURE — 76060000035 HC ANESTHESIA 2 TO 2.5 HR: Performed by: SURGERY

## 2020-11-16 PROCEDURE — 77030008608 HC TRCR ENDOSC SMTH AMR -B: Performed by: SURGERY

## 2020-11-16 PROCEDURE — P9045 ALBUMIN (HUMAN), 5%, 250 ML: HCPCS | Performed by: NURSE ANESTHETIST, CERTIFIED REGISTERED

## 2020-11-16 PROCEDURE — 65270000032 HC RM SEMIPRIVATE

## 2020-11-16 PROCEDURE — 77030031139 HC SUT VCRL2 J&J -A: Performed by: SURGERY

## 2020-11-16 PROCEDURE — 77030009965 HC RELD STPLR ENDOS COVD -D: Performed by: SURGERY

## 2020-11-16 PROCEDURE — 77030002895 HC DEV VASC CLOSR COVD -B: Performed by: SURGERY

## 2020-11-16 PROCEDURE — 76210000006 HC OR PH I REC 0.5 TO 1 HR: Performed by: SURGERY

## 2020-11-16 PROCEDURE — 77030038157 HC DEV PWR CNTR DISP SIGNIA COVD -C: Performed by: SURGERY

## 2020-11-16 PROCEDURE — 2709999900 HC NON-CHARGEABLE SUPPLY

## 2020-11-16 PROCEDURE — 77030040361 HC SLV COMPR DVT MDII -B: Performed by: SURGERY

## 2020-11-16 PROCEDURE — 88309 TISSUE EXAM BY PATHOLOGIST: CPT

## 2020-11-16 PROCEDURE — 74011250637 HC RX REV CODE- 250/637: Performed by: SURGERY

## 2020-11-16 PROCEDURE — 77030029684 HC NEB SM VOL KT MONA -A

## 2020-11-16 PROCEDURE — 77030002996 HC SUT SLK J&J -A: Performed by: SURGERY

## 2020-11-16 PROCEDURE — 77030008756 HC TU IRR SUC STRY -B: Performed by: SURGERY

## 2020-11-16 RX ORDER — PANTOPRAZOLE SODIUM 40 MG/1
40 TABLET, DELAYED RELEASE ORAL
Status: DISCONTINUED | OUTPATIENT
Start: 2020-11-17 | End: 2020-11-22 | Stop reason: HOSPADM

## 2020-11-16 RX ORDER — OXYCODONE HYDROCHLORIDE 5 MG/1
5 TABLET ORAL
Status: DISCONTINUED | OUTPATIENT
Start: 2020-11-16 | End: 2020-11-17

## 2020-11-16 RX ORDER — DIPHENHYDRAMINE HYDROCHLORIDE 50 MG/ML
12.5 INJECTION, SOLUTION INTRAMUSCULAR; INTRAVENOUS AS NEEDED
Status: DISCONTINUED | OUTPATIENT
Start: 2020-11-16 | End: 2020-11-16 | Stop reason: HOSPADM

## 2020-11-16 RX ORDER — NALOXONE HYDROCHLORIDE 0.4 MG/ML
0.4 INJECTION, SOLUTION INTRAMUSCULAR; INTRAVENOUS; SUBCUTANEOUS AS NEEDED
Status: DISCONTINUED | OUTPATIENT
Start: 2020-11-16 | End: 2020-11-22 | Stop reason: HOSPADM

## 2020-11-16 RX ORDER — PHENYLEPHRINE HCL IN 0.9% NACL 0.4MG/10ML
SYRINGE (ML) INTRAVENOUS AS NEEDED
Status: DISCONTINUED | OUTPATIENT
Start: 2020-11-16 | End: 2020-11-16

## 2020-11-16 RX ORDER — OXYCODONE HYDROCHLORIDE 5 MG/1
5 TABLET ORAL AS NEEDED
Status: DISCONTINUED | OUTPATIENT
Start: 2020-11-16 | End: 2020-11-16 | Stop reason: HOSPADM

## 2020-11-16 RX ORDER — FENTANYL CITRATE 50 UG/ML
25 INJECTION, SOLUTION INTRAMUSCULAR; INTRAVENOUS
Status: DISCONTINUED | OUTPATIENT
Start: 2020-11-16 | End: 2020-11-16 | Stop reason: HOSPADM

## 2020-11-16 RX ORDER — LIDOCAINE HYDROCHLORIDE 20 MG/ML
INJECTION, SOLUTION EPIDURAL; INFILTRATION; INTRACAUDAL; PERINEURAL AS NEEDED
Status: DISCONTINUED | OUTPATIENT
Start: 2020-11-16 | End: 2020-11-16 | Stop reason: HOSPADM

## 2020-11-16 RX ORDER — ALVIMOPAN 12 MG/1
12 CAPSULE ORAL 2 TIMES DAILY
Status: DISCONTINUED | OUTPATIENT
Start: 2020-11-17 | End: 2020-11-19

## 2020-11-16 RX ORDER — SODIUM CHLORIDE 0.9 % (FLUSH) 0.9 %
5-40 SYRINGE (ML) INJECTION AS NEEDED
Status: DISCONTINUED | OUTPATIENT
Start: 2020-11-16 | End: 2020-11-16 | Stop reason: HOSPADM

## 2020-11-16 RX ORDER — LIDOCAINE HYDROCHLORIDE ANHYDROUS AND DEXTROSE MONOHYDRATE .8; 5 G/100ML; G/100ML
INJECTION, SOLUTION INTRAVENOUS
Status: DISCONTINUED | OUTPATIENT
Start: 2020-11-16 | End: 2020-11-16 | Stop reason: HOSPADM

## 2020-11-16 RX ORDER — BUDESONIDE 0.5 MG/2ML
500 INHALANT ORAL
Status: DISCONTINUED | OUTPATIENT
Start: 2020-11-16 | End: 2020-11-22 | Stop reason: HOSPADM

## 2020-11-16 RX ORDER — DEXAMETHASONE SODIUM PHOSPHATE 4 MG/ML
INJECTION, SOLUTION INTRA-ARTICULAR; INTRALESIONAL; INTRAMUSCULAR; INTRAVENOUS; SOFT TISSUE AS NEEDED
Status: DISCONTINUED | OUTPATIENT
Start: 2020-11-16 | End: 2020-11-16 | Stop reason: HOSPADM

## 2020-11-16 RX ORDER — GABAPENTIN 300 MG/1
600 CAPSULE ORAL ONCE
Status: COMPLETED | OUTPATIENT
Start: 2020-11-16 | End: 2020-11-16

## 2020-11-16 RX ORDER — ROCURONIUM BROMIDE 10 MG/ML
INJECTION, SOLUTION INTRAVENOUS AS NEEDED
Status: DISCONTINUED | OUTPATIENT
Start: 2020-11-16 | End: 2020-11-16 | Stop reason: HOSPADM

## 2020-11-16 RX ORDER — ALBUMIN HUMAN 50 G/1000ML
SOLUTION INTRAVENOUS AS NEEDED
Status: DISCONTINUED | OUTPATIENT
Start: 2020-11-16 | End: 2020-11-16 | Stop reason: HOSPADM

## 2020-11-16 RX ORDER — MAGNESIUM SULFATE HEPTAHYDRATE 40 MG/ML
INJECTION, SOLUTION INTRAVENOUS AS NEEDED
Status: DISCONTINUED | OUTPATIENT
Start: 2020-11-16 | End: 2020-11-16 | Stop reason: HOSPADM

## 2020-11-16 RX ORDER — GABAPENTIN 300 MG/1
300 CAPSULE ORAL
Status: DISCONTINUED | OUTPATIENT
Start: 2020-11-16 | End: 2020-11-22 | Stop reason: HOSPADM

## 2020-11-16 RX ORDER — HYDROMORPHONE HYDROCHLORIDE 2 MG/ML
INJECTION, SOLUTION INTRAMUSCULAR; INTRAVENOUS; SUBCUTANEOUS AS NEEDED
Status: DISCONTINUED | OUTPATIENT
Start: 2020-11-16 | End: 2020-11-16 | Stop reason: HOSPADM

## 2020-11-16 RX ORDER — LIDOCAINE HYDROCHLORIDE 10 MG/ML
0.5 INJECTION, SOLUTION EPIDURAL; INFILTRATION; INTRACAUDAL; PERINEURAL AS NEEDED
Status: DISCONTINUED | OUTPATIENT
Start: 2020-11-16 | End: 2020-11-16 | Stop reason: HOSPADM

## 2020-11-16 RX ORDER — ONDANSETRON 2 MG/ML
4 INJECTION INTRAMUSCULAR; INTRAVENOUS AS NEEDED
Status: DISCONTINUED | OUTPATIENT
Start: 2020-11-16 | End: 2020-11-16 | Stop reason: HOSPADM

## 2020-11-16 RX ORDER — MIDAZOLAM HYDROCHLORIDE 1 MG/ML
1 INJECTION, SOLUTION INTRAMUSCULAR; INTRAVENOUS AS NEEDED
Status: DISCONTINUED | OUTPATIENT
Start: 2020-11-16 | End: 2020-11-16 | Stop reason: SDUPTHER

## 2020-11-16 RX ORDER — IPRATROPIUM BROMIDE 0.5 MG/2.5ML
500 SOLUTION RESPIRATORY (INHALATION)
Status: DISCONTINUED | OUTPATIENT
Start: 2020-11-16 | End: 2020-11-22 | Stop reason: HOSPADM

## 2020-11-16 RX ORDER — MIDAZOLAM HYDROCHLORIDE 1 MG/ML
1 INJECTION, SOLUTION INTRAMUSCULAR; INTRAVENOUS AS NEEDED
Status: DISCONTINUED | OUTPATIENT
Start: 2020-11-16 | End: 2020-11-16 | Stop reason: HOSPADM

## 2020-11-16 RX ORDER — SUCCINYLCHOLINE CHLORIDE 20 MG/ML
INJECTION INTRAMUSCULAR; INTRAVENOUS AS NEEDED
Status: DISCONTINUED | OUTPATIENT
Start: 2020-11-16 | End: 2020-11-16 | Stop reason: HOSPADM

## 2020-11-16 RX ORDER — LIDOCAINE HYDROCHLORIDE ANHYDROUS AND DEXTROSE MONOHYDRATE .8; 5 G/100ML; G/100ML
1 INJECTION, SOLUTION INTRAVENOUS CONTINUOUS
Status: DISPENSED | OUTPATIENT
Start: 2020-11-16 | End: 2020-11-18

## 2020-11-16 RX ORDER — SODIUM CHLORIDE, SODIUM LACTATE, POTASSIUM CHLORIDE, CALCIUM CHLORIDE 600; 310; 30; 20 MG/100ML; MG/100ML; MG/100ML; MG/100ML
75 INJECTION, SOLUTION INTRAVENOUS CONTINUOUS
Status: DISPENSED | OUTPATIENT
Start: 2020-11-16 | End: 2020-11-17

## 2020-11-16 RX ORDER — GUAIFENESIN 200 MG/1
400 TABLET ORAL
Status: DISCONTINUED | OUTPATIENT
Start: 2020-11-16 | End: 2020-11-22 | Stop reason: HOSPADM

## 2020-11-16 RX ORDER — ONDANSETRON 4 MG/1
4 TABLET, ORALLY DISINTEGRATING ORAL
Status: DISCONTINUED | OUTPATIENT
Start: 2020-11-16 | End: 2020-11-22 | Stop reason: HOSPADM

## 2020-11-16 RX ORDER — ALBUTEROL SULFATE 0.83 MG/ML
2.5 SOLUTION RESPIRATORY (INHALATION)
Status: DISCONTINUED | OUTPATIENT
Start: 2020-11-16 | End: 2020-11-22 | Stop reason: HOSPADM

## 2020-11-16 RX ORDER — ACETAMINOPHEN 500 MG
1000 TABLET ORAL EVERY 6 HOURS
Status: DISCONTINUED | OUTPATIENT
Start: 2020-11-16 | End: 2020-11-22 | Stop reason: HOSPADM

## 2020-11-16 RX ORDER — FENTANYL CITRATE 50 UG/ML
50 INJECTION, SOLUTION INTRAMUSCULAR; INTRAVENOUS AS NEEDED
Status: DISCONTINUED | OUTPATIENT
Start: 2020-11-16 | End: 2020-11-16 | Stop reason: HOSPADM

## 2020-11-16 RX ORDER — MIDAZOLAM HYDROCHLORIDE 1 MG/ML
INJECTION, SOLUTION INTRAMUSCULAR; INTRAVENOUS AS NEEDED
Status: DISCONTINUED | OUTPATIENT
Start: 2020-11-16 | End: 2020-11-16 | Stop reason: HOSPADM

## 2020-11-16 RX ORDER — SODIUM CHLORIDE, SODIUM LACTATE, POTASSIUM CHLORIDE, CALCIUM CHLORIDE 600; 310; 30; 20 MG/100ML; MG/100ML; MG/100ML; MG/100ML
125 INJECTION, SOLUTION INTRAVENOUS CONTINUOUS
Status: DISCONTINUED | OUTPATIENT
Start: 2020-11-16 | End: 2020-11-16 | Stop reason: HOSPADM

## 2020-11-16 RX ORDER — MIDAZOLAM HYDROCHLORIDE 1 MG/ML
1 INJECTION, SOLUTION INTRAMUSCULAR; INTRAVENOUS
Status: DISCONTINUED | OUTPATIENT
Start: 2020-11-16 | End: 2020-11-16 | Stop reason: HOSPADM

## 2020-11-16 RX ORDER — ARFORMOTEROL TARTRATE 15 UG/2ML
15 SOLUTION RESPIRATORY (INHALATION)
Status: DISCONTINUED | OUTPATIENT
Start: 2020-11-16 | End: 2020-11-22 | Stop reason: HOSPADM

## 2020-11-16 RX ORDER — SODIUM CHLORIDE 0.9 % (FLUSH) 0.9 %
5-40 SYRINGE (ML) INJECTION EVERY 8 HOURS
Status: DISCONTINUED | OUTPATIENT
Start: 2020-11-16 | End: 2020-11-16 | Stop reason: HOSPADM

## 2020-11-16 RX ORDER — ATORVASTATIN CALCIUM 10 MG/1
10 TABLET, FILM COATED ORAL DAILY
Status: DISCONTINUED | OUTPATIENT
Start: 2020-11-17 | End: 2020-11-22 | Stop reason: HOSPADM

## 2020-11-16 RX ORDER — MORPHINE SULFATE 10 MG/ML
2 INJECTION, SOLUTION INTRAMUSCULAR; INTRAVENOUS
Status: DISCONTINUED | OUTPATIENT
Start: 2020-11-16 | End: 2020-11-16 | Stop reason: HOSPADM

## 2020-11-16 RX ORDER — KETAMINE HYDROCHLORIDE 10 MG/ML
INJECTION, SOLUTION INTRAMUSCULAR; INTRAVENOUS AS NEEDED
Status: DISCONTINUED | OUTPATIENT
Start: 2020-11-16 | End: 2020-11-16 | Stop reason: HOSPADM

## 2020-11-16 RX ORDER — ACETAMINOPHEN 325 MG/1
650 TABLET ORAL ONCE
Status: DISCONTINUED | OUTPATIENT
Start: 2020-11-16 | End: 2020-11-16 | Stop reason: HOSPADM

## 2020-11-16 RX ORDER — ONDANSETRON 2 MG/ML
INJECTION INTRAMUSCULAR; INTRAVENOUS AS NEEDED
Status: DISCONTINUED | OUTPATIENT
Start: 2020-11-16 | End: 2020-11-16 | Stop reason: HOSPADM

## 2020-11-16 RX ORDER — ALVIMOPAN 12 MG/1
12 CAPSULE ORAL ONCE
Status: COMPLETED | OUTPATIENT
Start: 2020-11-16 | End: 2020-11-16

## 2020-11-16 RX ORDER — PROPOFOL 10 MG/ML
INJECTION, EMULSION INTRAVENOUS AS NEEDED
Status: DISCONTINUED | OUTPATIENT
Start: 2020-11-16 | End: 2020-11-16 | Stop reason: HOSPADM

## 2020-11-16 RX ORDER — DEXTROSE, SODIUM CHLORIDE, SODIUM LACTATE, POTASSIUM CHLORIDE, AND CALCIUM CHLORIDE 5; .6; .31; .03; .02 G/100ML; G/100ML; G/100ML; G/100ML; G/100ML
125 INJECTION, SOLUTION INTRAVENOUS CONTINUOUS
Status: DISCONTINUED | OUTPATIENT
Start: 2020-11-16 | End: 2020-11-16 | Stop reason: HOSPADM

## 2020-11-16 RX ORDER — ESCITALOPRAM OXALATE 10 MG/1
10 TABLET ORAL DAILY
Status: DISCONTINUED | OUTPATIENT
Start: 2020-11-17 | End: 2020-11-22 | Stop reason: HOSPADM

## 2020-11-16 RX ORDER — ACETAMINOPHEN 500 MG
1000 TABLET ORAL ONCE
Status: COMPLETED | OUTPATIENT
Start: 2020-11-16 | End: 2020-11-16

## 2020-11-16 RX ORDER — IPRATROPIUM BROMIDE AND ALBUTEROL SULFATE 2.5; .5 MG/3ML; MG/3ML
3 SOLUTION RESPIRATORY (INHALATION)
Status: DISCONTINUED | OUTPATIENT
Start: 2020-11-16 | End: 2020-11-22 | Stop reason: HOSPADM

## 2020-11-16 RX ORDER — FENTANYL CITRATE 50 UG/ML
INJECTION, SOLUTION INTRAMUSCULAR; INTRAVENOUS AS NEEDED
Status: DISCONTINUED | OUTPATIENT
Start: 2020-11-16 | End: 2020-11-16 | Stop reason: HOSPADM

## 2020-11-16 RX ORDER — BUPIVACAINE HYDROCHLORIDE 5 MG/ML
50 INJECTION, SOLUTION EPIDURAL; INTRACAUDAL ONCE
Status: COMPLETED | OUTPATIENT
Start: 2020-11-16 | End: 2020-11-16

## 2020-11-16 RX ORDER — HYDROMORPHONE HYDROCHLORIDE 1 MG/ML
1 INJECTION, SOLUTION INTRAMUSCULAR; INTRAVENOUS; SUBCUTANEOUS
Status: DISCONTINUED | OUTPATIENT
Start: 2020-11-16 | End: 2020-11-22 | Stop reason: HOSPADM

## 2020-11-16 RX ADMIN — HYDROMORPHONE HYDROCHLORIDE 1 MG: 1 INJECTION, SOLUTION INTRAMUSCULAR; INTRAVENOUS; SUBCUTANEOUS at 23:45

## 2020-11-16 RX ADMIN — IPRATROPIUM BROMIDE 0.5 MG: 0.5 SOLUTION RESPIRATORY (INHALATION) at 20:54

## 2020-11-16 RX ADMIN — HYDROMORPHONE HYDROCHLORIDE 1 MG: 1 INJECTION, SOLUTION INTRAMUSCULAR; INTRAVENOUS; SUBCUTANEOUS at 15:08

## 2020-11-16 RX ADMIN — SODIUM CHLORIDE, SODIUM LACTATE, POTASSIUM CHLORIDE, AND CALCIUM CHLORIDE 75 ML/HR: 600; 310; 30; 20 INJECTION, SOLUTION INTRAVENOUS at 06:44

## 2020-11-16 RX ADMIN — HYDROMORPHONE HYDROCHLORIDE 0.6 MG: 2 INJECTION, SOLUTION INTRAMUSCULAR; INTRAVENOUS; SUBCUTANEOUS at 09:42

## 2020-11-16 RX ADMIN — MAGNESIUM SULFATE 2 G: 2 INJECTION INTRAVENOUS at 07:56

## 2020-11-16 RX ADMIN — OXYCODONE HYDROCHLORIDE 5 MG: 5 TABLET ORAL at 17:56

## 2020-11-16 RX ADMIN — HYDROMORPHONE HYDROCHLORIDE 1 MG: 1 INJECTION, SOLUTION INTRAMUSCULAR; INTRAVENOUS; SUBCUTANEOUS at 20:09

## 2020-11-16 RX ADMIN — KETAMINE HYDROCHLORIDE 35 MG: 10 INJECTION, SOLUTION INTRAMUSCULAR; INTRAVENOUS at 08:01

## 2020-11-16 RX ADMIN — ONDANSETRON HYDROCHLORIDE 4 MG: 2 INJECTION, SOLUTION INTRAMUSCULAR; INTRAVENOUS at 09:17

## 2020-11-16 RX ADMIN — ROCURONIUM BROMIDE 10 MG: 10 SOLUTION INTRAVENOUS at 08:48

## 2020-11-16 RX ADMIN — BUDESONIDE 500 MCG: 0.5 INHALANT RESPIRATORY (INHALATION) at 12:22

## 2020-11-16 RX ADMIN — SODIUM CHLORIDE, SODIUM LACTATE, POTASSIUM CHLORIDE, AND CALCIUM CHLORIDE 75 ML/HR: 600; 310; 30; 20 INJECTION, SOLUTION INTRAVENOUS at 09:45

## 2020-11-16 RX ADMIN — ALVIMOPAN 12 MG: 12 CAPSULE ORAL at 06:31

## 2020-11-16 RX ADMIN — ACETAMINOPHEN 1000 MG: 500 TABLET ORAL at 06:31

## 2020-11-16 RX ADMIN — FENTANYL CITRATE 25 MCG: 50 INJECTION, SOLUTION INTRAMUSCULAR; INTRAVENOUS at 10:45

## 2020-11-16 RX ADMIN — LIDOCAINE HYDROCHLORIDE 100 MG: 20 INJECTION, SOLUTION EPIDURAL; INFILTRATION; INTRACAUDAL; PERINEURAL at 07:39

## 2020-11-16 RX ADMIN — ACETAMINOPHEN 1000 MG: 500 TABLET ORAL at 20:08

## 2020-11-16 RX ADMIN — GABAPENTIN 600 MG: 300 CAPSULE ORAL at 06:31

## 2020-11-16 RX ADMIN — ROCURONIUM BROMIDE 5 MG: 10 SOLUTION INTRAVENOUS at 07:39

## 2020-11-16 RX ADMIN — ONDANSETRON 4 MG: 4 TABLET, ORALLY DISINTEGRATING ORAL at 23:19

## 2020-11-16 RX ADMIN — OXYCODONE HYDROCHLORIDE 5 MG: 5 TABLET ORAL at 13:03

## 2020-11-16 RX ADMIN — MIDAZOLAM 1 MG: 1 INJECTION INTRAMUSCULAR; INTRAVENOUS at 10:01

## 2020-11-16 RX ADMIN — FENTANYL CITRATE 25 MCG: 50 INJECTION, SOLUTION INTRAMUSCULAR; INTRAVENOUS at 10:06

## 2020-11-16 RX ADMIN — SODIUM CHLORIDE, POTASSIUM CHLORIDE, SODIUM LACTATE AND CALCIUM CHLORIDE: 600; 310; 30; 20 INJECTION, SOLUTION INTRAVENOUS at 06:43

## 2020-11-16 RX ADMIN — ARFORMOTEROL TARTRATE 15 MCG: 15 SOLUTION RESPIRATORY (INHALATION) at 20:54

## 2020-11-16 RX ADMIN — HYDROMORPHONE HYDROCHLORIDE 0.6 MG: 2 INJECTION, SOLUTION INTRAMUSCULAR; INTRAVENOUS; SUBCUTANEOUS at 09:25

## 2020-11-16 RX ADMIN — PROPOFOL 130 MG: 10 INJECTION, EMULSION INTRAVENOUS at 07:39

## 2020-11-16 RX ADMIN — ONDANSETRON 4 MG: 4 TABLET, ORALLY DISINTEGRATING ORAL at 13:01

## 2020-11-16 RX ADMIN — MIDAZOLAM 2 MG: 1 INJECTION INTRAMUSCULAR; INTRAVENOUS at 07:25

## 2020-11-16 RX ADMIN — GABAPENTIN 300 MG: 300 CAPSULE ORAL at 20:08

## 2020-11-16 RX ADMIN — IPRATROPIUM BROMIDE 0.5 MG: 0.5 SOLUTION RESPIRATORY (INHALATION) at 12:22

## 2020-11-16 RX ADMIN — CEFOTETAN DISODIUM 2 G: 2 INJECTION, POWDER, FOR SOLUTION INTRAMUSCULAR; INTRAVENOUS at 07:46

## 2020-11-16 RX ADMIN — BUDESONIDE 500 MCG: 0.5 INHALANT RESPIRATORY (INHALATION) at 20:54

## 2020-11-16 RX ADMIN — SUCCINYLCHOLINE CHLORIDE 140 MG: 20 INJECTION, SOLUTION INTRAMUSCULAR; INTRAVENOUS at 07:40

## 2020-11-16 RX ADMIN — FENTANYL CITRATE 50 MCG: 50 INJECTION, SOLUTION INTRAMUSCULAR; INTRAVENOUS at 08:19

## 2020-11-16 RX ADMIN — PHENYLEPHRINE HYDROCHLORIDE 20 MCG/MIN: 10 INJECTION INTRAVENOUS at 08:32

## 2020-11-16 RX ADMIN — ALBUMIN (HUMAN) 250 ML: 12.5 INJECTION, SOLUTION INTRAVENOUS at 07:47

## 2020-11-16 RX ADMIN — ARFORMOTEROL TARTRATE 15 MCG: 15 SOLUTION RESPIRATORY (INHALATION) at 12:21

## 2020-11-16 RX ADMIN — SODIUM CHLORIDE, SODIUM LACTATE, POTASSIUM CHLORIDE, AND CALCIUM CHLORIDE 75 ML/HR: 600; 310; 30; 20 INJECTION, SOLUTION INTRAVENOUS at 14:10

## 2020-11-16 RX ADMIN — DEXAMETHASONE SODIUM PHOSPHATE 4 MG: 4 INJECTION, SOLUTION INTRAMUSCULAR; INTRAVENOUS at 07:55

## 2020-11-16 RX ADMIN — LIDOCAINE HYDROCHLORIDE 2 MG/KG/HR: 8 INJECTION, SOLUTION INTRAVENOUS at 07:44

## 2020-11-16 RX ADMIN — DIPHENHYDRAMINE HYDROCHLORIDE 12.5 MG: 50 INJECTION, SOLUTION INTRAMUSCULAR; INTRAVENOUS at 10:10

## 2020-11-16 RX ADMIN — ROCURONIUM BROMIDE 25 MG: 10 SOLUTION INTRAVENOUS at 07:44

## 2020-11-16 RX ADMIN — ACETAMINOPHEN 1000 MG: 500 TABLET ORAL at 13:02

## 2020-11-16 RX ADMIN — FENTANYL CITRATE 25 MCG: 50 INJECTION, SOLUTION INTRAMUSCULAR; INTRAVENOUS at 10:01

## 2020-11-16 RX ADMIN — LIDOCAINE HYDROCHLORIDE 1 MG/KG/HR: 8 INJECTION, SOLUTION INTRAVENOUS at 09:45

## 2020-11-16 RX ADMIN — SUGAMMADEX 200 MG: 100 INJECTION, SOLUTION INTRAVENOUS at 09:21

## 2020-11-16 RX ADMIN — FENTANYL CITRATE 50 MCG: 50 INJECTION, SOLUTION INTRAMUSCULAR; INTRAVENOUS at 07:40

## 2020-11-16 NOTE — H&P
Subjective:      Johanna Rosales is a 72 y.o. female who is being seen for evaluation of ascending colon polyp. She recently underwent colonoscopy, which revealed a seemingly unresectable ascending colon polyp. She underwent a second attempt through a alternate technique, with technical limitations and the morphology of the polyp precluded safe resection. Therefore, the procedure was terminated and she was referred to surgery for evaluation. She has a history of breast cancer and lung cancer, both managed through mastectomy and left upper lobe resection, respectively. From an oncology standpoint, she is without evidence of disease. She has a history of COPD, with nocturnal oxygen requirement and intermittent daytime oxygen requirement. 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   She denies abdominal pain, weight loss, bone pain, fever, chills, change in bowel habits, or blood per rectum.          Patient Active Problem List     Diagnosis Date Noted    Simple chronic bronchitis ([address] 11/04/2020    Malignant neoplasm of upper lobe of left lung ([address] 11/04/2020    Gastroesophageal reflux disease without esophagitis 11/04/2020    Controlled type 2 diabetes mellitus without complication, without long-term current use of insulin ([address] 11/04/2020    Essential hypertension 11/04/2020    Mass of colon, ascending colon 11/04/2020    Post-op pain 02/07/2011    Breast cancer, stage 2 ([address] 01/25/2011    Neuropathy 01/25/2011    Chronic fatigue 01/25/2011    Dyspnea 01/25/2011    Lupus ([address] 01/25/2011           Past Medical History:   Diagnosis Date    Arthritis      Asthma      Breast cancer, stage 2 ([address] 1/25/2011    Cancer ([address] 2004     BREAST CA left no sticks/bp in left arm    Chronic fatigue 1/25/2011    Chronic kidney disease       HEMATURIA    Chronic pain       JOINT PAIN AND LEFT CHEST PAIN    Chronic pain       possible fibromyalgia    Diabetes ([facility]      Dyspnea 1/25/2011    GERD (gastroesophageal reflux disease)      High cholesterol      Lupus (HCC) 1/25/2011    Nausea & vomiting      Neuropathy 1/25/2011    PMR (polymyalgia rheumatica) (HCC)      S/P chemotherapy, time since greater than 12 weeks      S/P radiation therapy > 12 wks ago      Unspecified adverse effect of anesthesia 2010     SEVERE HYPERTENSION DURING ORAL SURGERY    Use of letrozole ([facility])      Vitamin D deficiency              Past Surgical History:   Procedure Laterality Date    COLONOSCOPY N/A 8/31/2020     COLONOSCOPY   :- performed by Radames Blakely MD at [address] [place] [address] ENDOSCOPY    COLONOSCOPY N/A 10/15/2020     COLONOSCOPY with EMR performed by Medardo Rausch MD at [address] [place] [address] ENDOSCOPY    HX AMPUTATION         RIGHT INDEX FINGER    HX CHOLECYSTECTOMY        HX GI         COLONSOCPY/EGD    HX MASTECTOMY         LEFT AND LYMPH NODES    HX MODIFIED RADICAL MASTECTOMY   9-28-04     left, w/ sentinel, Dr. Binnie Kussmaul HX RENAL BIOPSY        HX UROLOGICAL         CYSTO/STENTS    [place] LAP,LYMPH NODE BX   9-28-04     left axilla      Social History            Tobacco Use    Smoking status: Current Every Day Smoker       Packs/day: 1.50       Years: 34.00       Pack years: 51.00    Smokeless tobacco: Never Used   Substance Use Topics    Alcohol use: No            Family History   Problem Relation Age of Onset    Alcohol abuse Mother      Cancer Mother           LUNG CA, BREAST CA    Lung Disease Mother      Alcohol abuse Father      Cancer Father           STOMACH CA           Current Outpatient Medications   Medication Sig    lisinopriL (PRINIVIL, ZESTRIL) 10 mg tablet Take  by mouth daily.  Oxygen 2.5 liters at night and 2 liters prn during the day    sod picosulf-mag ox-citric ac (Clenpiq) 10 mg-3.5 gram -12 gram/160 mL soln Take 1 Bottle by mouth two (2) times a day.  Drink 1 bottle on day before surgery at 12 PM and 3 PM    metoclopramide HCl (REGLAN) 10 mg tablet Take 1 tablet by mouth at 6 AM, 12 PM, and 5 PM on day before surgery    neomycin (MYCIFRADIN) 500 mg tablet Take 2 tablets at 8 AM, 9 AM, and 5 PM on day before procedure    metroNIDAZOLE (FLAGYL) 500 mg tablet Take 1 tablet at 8 AM, 9 AM, and 5 PM on day before procedure    Omeprazole delayed release (PRILOSEC D/R) 20 mg tablet Take 20 mg by mouth daily.  budesonide/formoterol fumarate (SYMBICORT IN) Take  by inhalation.  umeclidinium bromide (INCRUSE ELLIPTA IN) Take  by inhalation.  gabapentin enacarbil (HORIZANT) 600 mg Tb24 Take 1 Tab by mouth nightly.  albuterol-ipratropium (DUONEB) 0.5 mg-3 mg(2.5 mg base)/3 mL nebulizer solution 3 mL by Nebulization route two (2) times daily as needed.  LEXAPRO 20 mg tablet 10 mg nightly.  PROAIR HFA 90 mcg/Actuation inhaler      omega-3 fatty acids-vitamin e (FISH OIL) 1,000 mg cap Take 1 Cap by mouth daily.  esomeprazole (NEXIUM) 40 mg capsule Take  by mouth daily.  sucralfate (CARAFATE) 100 mg/mL suspension Take 2 tsp by mouth four (4) times daily. Indications: esophagitis    LACTOBACILLUS ACIDOPHILUS (ACIDOPHOLUS PO) Take  by mouth nightly.  CALCIUM CARBONATE (CALTRATE 600 PO) Take 1 Tab by mouth daily.  fentanyl (DURAGESIC) 75 mcg/hr      metformin (GLUCOPHAGE) 500 mg tablet Take 500 mg by mouth three (3) times daily (with meals).  torsemide (DEMADEX) 10 mg tablet Take 10 mg by mouth daily.      No current facility-administered medications for this visit.              Allergies   Allergen Reactions    Latex Other (comments)    Contrast Agent [Iodine] Anaphylaxis       X-ray dye  Pt states not allergy    Codeine Unknown (comments)    Demerol [Meperidine] Unknown (comments)    Januvia [Sitagliptin] Other (comments)       Severe chest pain    Phenergan [Promethazine] Anxiety    Talwin Compound Unknown (comments)         Review of Systems: A complete review of systems is negative except as noted in HPI.     Objective:         Visit Vitals  BP (!) 155/57 (BP 1 Location: Left arm, BP Patient Position: At rest)   Pulse 70   Temp 98.5 °F (36.9 °C)   Resp 16   Ht 5' 5\" (1.651 m)   Wt 70.3 kg (155 lb)   SpO2 99%   BMI 25.79 kg/m²        Physical Exam:  GENERAL: alert, cooperative, no distress, appears older than stated age, EYE: negative, LYMPH NODES: No cervical or supraclavicular adenopathy. THROAT & NECK: normal, LUNG: clear to auscultation bilaterally, HEART: regular rate and rhythm, S1, S2 normal, no murmur. ABDOMEN: NABS, nondistended, soft. Well-healed right upper quadrant Kocher incision, left upper quadrant nephrostomy incision. No pain with palpation, mass, or hernia. EXTREMITIES:  extremities normal, atraumatic, no cyanosis or edema, SKIN: Healed bilateral mastectomy scars. NEUROLOGIC: negative     Lab/Data Review:  Endoscopy reports/findings reviewed.        Assessment:      Unresectable ascending colon polyp. Central umbilication at polyp precluded safe excision. She was referred for laparoscopic resection. She has history of breast cancer and lung cancer but is without evidence of disease. She has COPD but despite oxygen requirement appears quite functional with activities of daily life.     Plan:      1. I recommend proceeding with laparoscopic right colectomy.      2. Discussed aspects of surgical intervention, methods, risks (including by not limited to infection, bleeding, hematoma, open procedure, malignant findings, injury to surrounding structures, and perforation of the intestines or solid organs), and the risks of general anesthetic. The patient understands the risks; all questions were answered to the patient's satisfaction.     3. Patient does wish to proceed with surgery.

## 2020-11-16 NOTE — OP NOTES
[address] [facility] [place]  OPERATIVE REPORT    Name:  Braxton Carmichael  MR#:  [id number]  :  1950  ACCOUNT #:  [de-identified]  DATE OF SERVICE:  2020    PREOPERATIVE DIAGNOSIS:  Unresectable right colon polyp. POSTOPERATIVE DIAGNOSIS:  Unresectable right colon polyp. PROCEDURE PERFORMED:  Laparoscopic right colectomy with ileocolostomy (CPT I0710861). SURGEON:  Kylah Hatfield MD    ASSISTANT:  Dalton Calvert MD    ANESTHESIA:  General endotracheal.    COMPLICATIONS:  None. SPECIMENS REMOVED:  Right colon. IMPLANTS:  None. ESTIMATED BLOOD LOSS:  75 mL. DRAINS:  None. COUNTS:  Sponge count correct. Needle count correct. INDICATIONS:  The patient is a 76-year-old white female with multiple medical problems to include severe COPD requiring home oxygen, prior breast cancer, prior lung cancer, who was found to have multiple colonic polyps on recent screening colonoscopy. The right colon polyp could not be safely removed using standard technique, so an attempt at EMR was performed, also unsuccessful. She was referred to General Surgery for evaluation. She is taken to the operating room today for laparoscopic right colectomy. I have asked Dalton Calvert MD to assist with the procedure given the technical complexity of laparoscopic colorectal surgery and the patient's multiple prior abdominal surgeries. He will run the laparoscope, assist in mobilization of the right colon, assist in resection, and assist in ileocolostomy formation. FINDINGS:  Ascending colon polyp, completely removed within specimen. Stapled side-to-side, functional end-to-end ileocolostomy. PROCEDURE:  The patient was identified as the correct patient in the preoperative holding area, and informed consent was confirmed. After answering the patient's remaining questions, she was taken to the operating room and placed on the operating room table in the supine position.   Sequential compression device were replaced on both lower extremities. Following the uneventful initiation of general anesthesia, a Guallpa catheter was placed within the bladder, and she was carefully secured to the operating room table with safety strap in place. All potential pressure points were padded with eggcrate. Her left arm was tucked to her side. Her abdomen was prepped and draped in the usual sterile fashion. Final time-out was performed, and it was confirmed she had received intravenous antibiotics. A 5 mm trocar was inserted through a small left lower quadrant skin incision using an Optiview technique. After confirming the peritoneal location of the trocar tip, insufflation with carbon dioxide gas was initiated. Once adequate working sites had been developed, a 5 mm, 30-degree laparoscope was inserted. No signs of trocar injury were present. Adhesions from prior open cholecystectomy were identified in the upper abdomen. A periumbilical 5 mm trocar and epigastric 5 mm trocar were both inserted through small incisions using visual guidance with the laparoscope. A suprapubic 5 mm trocar was inserted using identical technique. The initial 5 mm trocar was upsized to a 12 mm left lower quadrant trocar. Adhesions between the omentum, transverse colon and the anterior abdominal wall were taken down using a bipolar device and blunt dissection. Once these adhesions had been lysed, the patient was placed in a steep Trendelenburg position. The small bowel was retracted into the upper abdomen. The cecum was identified with Salma Brooks from prior tattooing in the right colon. With the cecum elevated anteriorly, the ileocolic trunk was identified. The peritoneum over this structure was serially scored and dissected using careful blunt dissection. This allowed isolation of the ileocolic trunk. The ileocolic trunk was then divided with a tan load linear stapler firing.   This allowed development of a plane behind the right colon and anterior to the retroperitoneal structures. The dissection was carried bluntly, laterally towards the sidewall. This plane was then developed cephalad until the duodenum was identified. The duodenum was freed from the posterior aspect of the mesocolon and allowed to lie within the retroperitoneum. Mesocolon of the distal right colon was then serially ligated and divided using the bipolar device. The terminal ileum was freed from sidewall attachments using identical technique. The hepatic flexure was then taken down using blunt dissection and bipolar device with takedown of additional adhesions from prior open cholecystectomy. Once the colon had been completely freed from retroperitoneal structures and lateral attachments, the terminal ileum was divided with a tan load linear stapler firing. The transverse colon was divided using identical technique. A GelPort was then placed through a small periumbilical incision, to act as a wound protector. The specimen was withdrawn through the GelPort and opened on the back table, confirming the presence of the unresectable polyp within the specimen. The terminal ileum and transverse colon were brought alongside each other in side-to-side fashion. This orientation was maintained with 3-0 silk stay sutures. Electrocautery was used to make an opening in each structure, through which a 60 mm tan load linear stapler was carefully inserted. After confirming correct stapler insertion, it was closed and fired. The stapler was removed, and the staple line was noted to be hemostatic. The remaining opening in the bowel was closed with a running 3-0 Vicryl suture. The suture line was imbricated with 3-0 silk seromuscular sutures. A tension-relieving, anti-obstruction suture was placed at the distal edge of the staple line. After confirming adequate hemostasis of the mesenteric edges, the ileocolostomy was returned to the abdominal space.   The GelPort cover was placed, and pneumoperitoneum was re-established. No signs of bleeding or twisting of the bowel or its blood supply were present. The left lower quadrant 12 mm fascial defect was closed with a 0 Vicryl suture using a laparoscopic suture passer. Pneumoperitoneum was released, and all trocars were removed. All personnel then changed gowns and gloves with fresh instruments opened on the separate back table. The extraction fascial defect was closed with a running looped #1 PDS suture. The subcutaneous tissues were irrigated with sterile saline. All skin edges were reapproximated with skin staples. Sterile dressings were then applied. The patient tolerated the procedure well. She was extubated in the operating room and transported to the recovery area in stable condition. The attending surgeon, Dr. Grecia Vasquez, was scrubbed and present for the entire procedure.       Kolby Retana MD      [id number]  D:  11/16/2020 11:05  T:  11/16/2020 15:08  JOB #:  [id number]

## 2020-11-16 NOTE — ANESTHESIA POSTPROCEDURE EVALUATION
Procedure(s):  LAPAROSCOPIC RIGHT COLECTOMY (E R A S). general    Anesthesia Post Evaluation        Patient location during evaluation: PACU  Patient participation: complete - patient participated  Level of consciousness: awake and alert  Pain management: adequate  Airway patency: patent  Anesthetic complications: no  Cardiovascular status: acceptable  Respiratory status: acceptable  Hydration status: acceptable  Comments: I have seen and evaluated the patient and is ready for discharge. Micheline Yeung MD    Post anesthesia nausea and vomiting:  none      INITIAL Post-op Vital signs:   Vitals Value Taken Time   /59 11/16/2020 10:15 AM   Temp 37.1 °C (98.8 °F) 11/16/2020  9:42 AM   Pulse 72 11/16/2020 10:17 AM   Resp 24 11/16/2020 10:17 AM   SpO2 93 % 11/16/2020 10:17 AM   Vitals shown include unvalidated device data.

## 2020-11-16 NOTE — BRIEF OP NOTE
Brief Postoperative Note    Patient: Ana Gutiérrez  YOB: 1950  MRN: [id number]    Date of Procedure: 11/16/2020     Pre-Op Diagnosis: RIGHT COLON MASS    Post-Op Diagnosis: Same as preoperative diagnosis. Procedure(s):  LAPAROSCOPIC RIGHT COLECTOMY (E R A S)    Surgeon(s):  MD Joanna Anderson MD    Surgical Assistant: Surg Asst-1: Santana Gracia MD  Anesthesia: General     Estimated Blood Loss (mL): 75 cc    Complications: None    Specimens:   ID Type Source Tests Collected by Time Destination   1 : RIGHT COLON Fresh Colon, Right  Robinson Delaney MD 11/16/2020 7908 Pathology        Implants: * No implants in log *    Drains: * No LDAs found *    Findings: ascending colon polyp; no signs of malignancy.     Electronically Signed by Lidia Orozco MD on 11/16/2020 at 9:31 AM

## 2020-11-16 NOTE — ROUTINE PROCESS
Patient: Bessie Hughes MRN: [id number]  SSN: [id number] YOB: 1950  Age: 72 y.o. Sex: female Patient is status post Procedure(s): LAPAROSCOPIC RIGHT COLECTOMY (E R A S). Surgeon(s) and Role: Rosalba James MD - Primary Preet Michel MD - Assisting Local/Dose/Irrigation: 15ML 0.5% MARCAINE PLAIN Peripheral IV 11/16/20 Left Hand (Active) Dressing/Packing:  Wound Abdomen-Dressing/Treatment: Band-Aid/Adhesive bandage;Gauze dressing/dressing sponge;Tegaderm/Transparent film dressing(BAINDAID X3 TO PORT SITES AND 2X2 TEGADERM TO OPEN INCISION) (11/16/20 0900)

## 2020-11-16 NOTE — ADVANCED PRACTICE NURSE
Natanael called as patient has order for urinary catheter and does not have a catheter. Catheter was removed by OR nurse, Ponciano Hatchet, at the end of the case per Dr Wood Garza. Catheter order discontinued.

## 2020-11-17 LAB
ANION GAP SERPL CALC-SCNC: 7 MMOL/L (ref 5–15)
BUN SERPL-MCNC: 14 MG/DL (ref 6–20)
BUN/CREAT SERPL: 15 (ref 12–20)
CALCIUM SERPL-MCNC: 8.6 MG/DL (ref 8.5–10.1)
CHLORIDE SERPL-SCNC: 105 MMOL/L (ref 97–108)
CO2 SERPL-SCNC: 25 MMOL/L (ref 21–32)
CREAT SERPL-MCNC: 0.95 MG/DL (ref 0.55–1.02)
ERYTHROCYTE [DISTWIDTH] IN BLOOD BY AUTOMATED COUNT: 14.2 % (ref 11.5–14.5)
GLUCOSE SERPL-MCNC: 95 MG/DL (ref 65–100)
HCT VFR BLD AUTO: 30.3 % (ref 35–47)
HGB BLD-MCNC: 10.1 G/DL (ref 11.5–16)
MAGNESIUM SERPL-MCNC: 2.2 MG/DL (ref 1.6–2.4)
MCH RBC QN AUTO: 30.8 PG (ref 26–34)
MCHC RBC AUTO-ENTMCNC: 33.3 G/DL (ref 30–36.5)
MCV RBC AUTO: 92.4 FL (ref 80–99)
NRBC # BLD: 0 K/UL (ref 0–0.01)
NRBC BLD-RTO: 0 PER 100 WBC
PHOSPHATE SERPL-MCNC: 3.3 MG/DL (ref 2.6–4.7)
PLATELET # BLD AUTO: 168 K/UL (ref 150–400)
PMV BLD AUTO: 10.9 FL (ref 8.9–12.9)
POTASSIUM SERPL-SCNC: 4.2 MMOL/L (ref 3.5–5.1)
RBC # BLD AUTO: 3.28 M/UL (ref 3.8–5.2)
SODIUM SERPL-SCNC: 137 MMOL/L (ref 136–145)
WBC # BLD AUTO: 7.3 K/UL (ref 3.6–11)

## 2020-11-17 PROCEDURE — 74011250636 HC RX REV CODE- 250/636: Performed by: SURGERY

## 2020-11-17 PROCEDURE — 65270000032 HC RM SEMIPRIVATE

## 2020-11-17 PROCEDURE — 36415 COLL VENOUS BLD VENIPUNCTURE: CPT

## 2020-11-17 PROCEDURE — 51798 US URINE CAPACITY MEASURE: CPT

## 2020-11-17 PROCEDURE — 80048 BASIC METABOLIC PNL TOTAL CA: CPT

## 2020-11-17 PROCEDURE — 94664 DEMO&/EVAL PT USE INHALER: CPT

## 2020-11-17 PROCEDURE — 74011250636 HC RX REV CODE- 250/636: Performed by: NURSE PRACTITIONER

## 2020-11-17 PROCEDURE — 85027 COMPLETE CBC AUTOMATED: CPT

## 2020-11-17 PROCEDURE — 84100 ASSAY OF PHOSPHORUS: CPT

## 2020-11-17 PROCEDURE — 83735 ASSAY OF MAGNESIUM: CPT

## 2020-11-17 PROCEDURE — 94640 AIRWAY INHALATION TREATMENT: CPT

## 2020-11-17 PROCEDURE — 77030005513 HC CATH URETH FOL11 MDII -B

## 2020-11-17 PROCEDURE — 77010033678 HC OXYGEN DAILY

## 2020-11-17 PROCEDURE — 74011250637 HC RX REV CODE- 250/637: Performed by: SURGERY

## 2020-11-17 PROCEDURE — 74011000250 HC RX REV CODE- 250: Performed by: SURGERY

## 2020-11-17 RX ORDER — OXYCODONE HYDROCHLORIDE 5 MG/1
10 TABLET ORAL
Status: DISCONTINUED | OUTPATIENT
Start: 2020-11-17 | End: 2020-11-18

## 2020-11-17 RX ADMIN — OXYCODONE HYDROCHLORIDE 10 MG: 5 TABLET ORAL at 11:02

## 2020-11-17 RX ADMIN — IPRATROPIUM BROMIDE 0.5 MG: 0.5 SOLUTION RESPIRATORY (INHALATION) at 20:00

## 2020-11-17 RX ADMIN — LIDOCAINE HYDROCHLORIDE 1 MG/KG/HR: 8 INJECTION, SOLUTION INTRAVENOUS at 19:12

## 2020-11-17 RX ADMIN — ATORVASTATIN CALCIUM 10 MG: 10 TABLET, FILM COATED ORAL at 08:39

## 2020-11-17 RX ADMIN — OXYCODONE HYDROCHLORIDE 10 MG: 5 TABLET ORAL at 15:37

## 2020-11-17 RX ADMIN — OXYCODONE HYDROCHLORIDE 5 MG: 5 TABLET ORAL at 06:42

## 2020-11-17 RX ADMIN — BUDESONIDE 500 MCG: 0.5 INHALANT RESPIRATORY (INHALATION) at 07:35

## 2020-11-17 RX ADMIN — ACETAMINOPHEN 1000 MG: 500 TABLET ORAL at 07:00

## 2020-11-17 RX ADMIN — ALVIMOPAN 12 MG: 12 CAPSULE ORAL at 18:13

## 2020-11-17 RX ADMIN — GABAPENTIN 300 MG: 300 CAPSULE ORAL at 22:23

## 2020-11-17 RX ADMIN — PANTOPRAZOLE SODIUM 40 MG: 40 TABLET, DELAYED RELEASE ORAL at 07:00

## 2020-11-17 RX ADMIN — ONDANSETRON 4 MG: 4 TABLET, ORALLY DISINTEGRATING ORAL at 11:06

## 2020-11-17 RX ADMIN — ACETAMINOPHEN 1000 MG: 500 TABLET ORAL at 01:59

## 2020-11-17 RX ADMIN — ARFORMOTEROL TARTRATE 15 MCG: 15 SOLUTION RESPIRATORY (INHALATION) at 07:34

## 2020-11-17 RX ADMIN — ALVIMOPAN 12 MG: 12 CAPSULE ORAL at 08:39

## 2020-11-17 RX ADMIN — OXYCODONE HYDROCHLORIDE 10 MG: 5 TABLET ORAL at 21:15

## 2020-11-17 RX ADMIN — IPRATROPIUM BROMIDE 0.5 MG: 0.5 SOLUTION RESPIRATORY (INHALATION) at 07:35

## 2020-11-17 RX ADMIN — IPRATROPIUM BROMIDE 0.5 MG: 0.5 SOLUTION RESPIRATORY (INHALATION) at 02:35

## 2020-11-17 RX ADMIN — HYDROMORPHONE HYDROCHLORIDE 1 MG: 1 INJECTION, SOLUTION INTRAMUSCULAR; INTRAVENOUS; SUBCUTANEOUS at 08:48

## 2020-11-17 RX ADMIN — ACETAMINOPHEN 1000 MG: 500 TABLET ORAL at 12:31

## 2020-11-17 RX ADMIN — ONDANSETRON 4 MG: 4 TABLET, ORALLY DISINTEGRATING ORAL at 21:17

## 2020-11-17 RX ADMIN — ESCITALOPRAM OXALATE 10 MG: 10 TABLET ORAL at 08:39

## 2020-11-17 RX ADMIN — ONDANSETRON 4 MG: 4 TABLET, ORALLY DISINTEGRATING ORAL at 03:20

## 2020-11-17 RX ADMIN — ACETAMINOPHEN 1000 MG: 500 TABLET ORAL at 18:13

## 2020-11-17 RX ADMIN — BUDESONIDE 500 MCG: 0.5 INHALANT RESPIRATORY (INHALATION) at 21:00

## 2020-11-17 RX ADMIN — ARFORMOTEROL TARTRATE 15 MCG: 15 SOLUTION RESPIRATORY (INHALATION) at 21:00

## 2020-11-17 RX ADMIN — ONDANSETRON 4 MG: 4 TABLET, ORALLY DISINTEGRATING ORAL at 15:36

## 2020-11-17 RX ADMIN — HYDROMORPHONE HYDROCHLORIDE 1 MG: 1 INJECTION, SOLUTION INTRAMUSCULAR; INTRAVENOUS; SUBCUTANEOUS at 03:55

## 2020-11-17 RX ADMIN — OXYCODONE HYDROCHLORIDE 5 MG: 5 TABLET ORAL at 02:52

## 2020-11-17 RX ADMIN — HYDROMORPHONE HYDROCHLORIDE 1 MG: 1 INJECTION, SOLUTION INTRAMUSCULAR; INTRAVENOUS; SUBCUTANEOUS at 12:32

## 2020-11-17 NOTE — PROGRESS NOTES
Transition of Care: TBD; likely home with family/followup with specialist/PCP    Transp[ort Plan: in car with  Madelyn Phelan [phone]    RUR: 14%    Discharge pending:  -patient is POD#1 of laparscopic right colectomy  pending medical progress    1115: CM met with patient at bedside; patient is alert and oriented x 4; patient states she lives at home with her  who will drive her home at discharge time; she is normally independent in her ADLs; she has used [facility] for [facility] surgeries; pcp is Isabela Reyna NP and last visit was in September 2020; preferred pharmacy is the [facility] on [address] .    Reason for Admission:   Mass of hepatic flexure of colon                   RUR Score:   14%                  Plan for utilizing home health:   TBD: likely home with family/followup with specialist       PCP: First and Last name:  Alyse Pritchett NP   Name of Practice: [facility]    Are you a current patient: Yes/No: yes   Approximate date of last visit: September 2020   Can you participate in a virtual visit with your PCP: no                    Current Advanced Directive/Advance Care Plan: full code; no acp docs                         Transition of Care Plan:     TBD: likely home with family/followup with specialist/PCP; transport by  in car    Care Management Interventions  PCP Verified by CM: Guy Vela NP)  Last Visit to PCP: 09/17/20  Mode of Transport at Discharge: Other (see comment)(in car with )  Transition of Care Consult (CM Consult):  Other(TBD)  Physical Therapy Consult: No  Occupational Therapy Consult: No  Speech Therapy Consult: No  Current Support Network: Lives with Spouse  Confirm Follow Up Transport: Family  Discharge Location  Discharge Placement: (TBD)     Patient will need 2nd IM letter prior to discharge    CM following  Denver Jaimes, RN, CRM

## 2020-11-17 NOTE — PROGRESS NOTES
TRANSFER - IN REPORT:    Verbal report received from Rosa(name) on Sheree Leaver  being received from PACU(unit) for routine post - op      Report consisted of patients Situation, Background, Assessment and   Recommendations(SBAR). Information from the following report(s) SBAR and OR Summary was reviewed with the receiving nurse. Opportunity for questions and clarification was provided. Assessment completed upon patients arrival to unit and care assumed.

## 2020-11-17 NOTE — PROGRESS NOTES
Progress Note    Patient: Monty Ramos MRN: [id number]  SSN: [id number]    YOB: 1950  Age: 72 y.o. Sex: female      Admit Date: 2020    1 Day Post-Op    Procedure:  Procedure(s):  LAPAROSCOPIC RIGHT COLECTOMY (E R A S)    Subjective:     Patient complains of incisional pain. She has been up walking in the [place] with walker. She is her oxygen overnight. He tolerated a small amount of solid food. No BM or flatus. Objective:     Visit Vitals  BP (!) 109/52   Pulse 73   Temp 97.7 °F (36.5 °C)   Resp 18   Ht 5' 5\" (1.651 m)   Wt 73.2 kg (161 lb 4.8 oz)   SpO2 92%   Breastfeeding No   BMI 26.84 kg/m²       Temp (24hrs), Av.4 °F (36.9 °C), Min:97.7 °F (36.5 °C), Max:99 °F (37.2 °C)      Physical Exam:    LUNG: diminished breath sounds R base, L base, HEART: regular rate and rhythm, S1, S2 normal, no murmur, click, rub or gallop, ABDOMEN: Nondistended, soft. Wounds dry and intact with serosanguineous drainage at extraction wound. Appropriate incisional pain with palpation.     Data Review: Vital signs, ins/outs, labs    Lab Review:   Recent Results (from the past 24 hour(s))   CBC W/O DIFF    Collection Time: 20  2:57 AM   Result Value Ref Range    WBC 7.3 3.6 - 11.0 K/uL    RBC 3.28 (L) 3.80 - 5.20 M/uL    HGB 10.1 (L) 11.5 - 16.0 g/dL    HCT 30.3 (L) 35.0 - 47.0 %    MCV 92.4 80.0 - 99.0 FL    MCH 30.8 26.0 - 34.0 PG    MCHC 33.3 30.0 - 36.5 g/dL    RDW 14.2 11.5 - 14.5 %    PLATELET 007 508 - 846 K/uL    MPV 10.9 8.9 - 12.9 FL    NRBC 0.0 0  WBC    ABSOLUTE NRBC 0.00 0.00 - 3.25 K/uL   METABOLIC PANEL, BASIC    Collection Time: 20  2:57 AM   Result Value Ref Range    Sodium 137 136 - 145 mmol/L    Potassium 4.2 3.5 - 5.1 mmol/L    Chloride 105 97 - 108 mmol/L    CO2 25 21 - 32 mmol/L    Anion gap 7 5 - 15 mmol/L    Glucose 95 65 - 100 mg/dL    BUN 14 6 - 20 MG/DL    Creatinine 0.95 0.55 - 1.02 MG/DL    BUN/Creatinine ratio 15 12 - 20      GFR est AA >60 >60 ml/min/1.73m2    GFR est non-AA 58 (L) >60 ml/min/1.73m2    Calcium 8.6 8.5 - 10.1 MG/DL   MAGNESIUM    Collection Time: 20  2:57 AM   Result Value Ref Range    Magnesium 2.2 1.6 - 2.4 mg/dL   PHOSPHORUS    Collection Time: 20  2:57 AM   Result Value Ref Range    Phosphorus 3.3 2.6 - 4.7 MG/DL         Assessment:     Hospital Problems  Date Reviewed: 2020          Codes Class Noted POA    Mass of hepatic flexure of colon ICD-10-CM: K63.89  ICD-9-CM: 569.89  2020 Unknown            Awaiting return of bowel function. Plan/Recommendations/Medical Decision Makin. Continue diet as tolerated. 2.  Adjust analgesic regimen. 3.  Prior to admission medications. 4.  Increase spirometry. Continue oxygen as needed. 5.  Labs in a.m.

## 2020-11-17 NOTE — PROGRESS NOTES
Complain of not being able to breathe. H.O.B  Elevated. Pulse ox 95% on 2l. Respiratory therapy called for jet neb.

## 2020-11-18 ENCOUNTER — APPOINTMENT (OUTPATIENT)
Dept: GENERAL RADIOLOGY | Age: 70
DRG: 330 | End: 2020-11-18
Attending: NURSE PRACTITIONER
Payer: MEDICARE

## 2020-11-18 LAB
ANION GAP SERPL CALC-SCNC: 3 MMOL/L (ref 5–15)
BASOPHILS # BLD: 0.1 K/UL (ref 0–0.1)
BASOPHILS NFR BLD: 1 % (ref 0–1)
BUN SERPL-MCNC: 16 MG/DL (ref 6–20)
BUN/CREAT SERPL: 19 (ref 12–20)
CALCIUM SERPL-MCNC: 8.9 MG/DL (ref 8.5–10.1)
CHLORIDE SERPL-SCNC: 104 MMOL/L (ref 97–108)
CO2 SERPL-SCNC: 29 MMOL/L (ref 21–32)
CREAT SERPL-MCNC: 0.83 MG/DL (ref 0.55–1.02)
DIFFERENTIAL METHOD BLD: ABNORMAL
EOSINOPHIL # BLD: 0.3 K/UL (ref 0–0.4)
EOSINOPHIL NFR BLD: 4 % (ref 0–7)
ERYTHROCYTE [DISTWIDTH] IN BLOOD BY AUTOMATED COUNT: 14.4 % (ref 11.5–14.5)
GLUCOSE SERPL-MCNC: 100 MG/DL (ref 65–100)
HCT VFR BLD AUTO: 30.8 % (ref 35–47)
HGB BLD-MCNC: 10.3 G/DL (ref 11.5–16)
IMM GRANULOCYTES # BLD AUTO: 0 K/UL (ref 0–0.04)
IMM GRANULOCYTES NFR BLD AUTO: 0 % (ref 0–0.5)
LYMPHOCYTES # BLD: 0.8 K/UL (ref 0.8–3.5)
LYMPHOCYTES NFR BLD: 11 % (ref 12–49)
MCH RBC QN AUTO: 31.1 PG (ref 26–34)
MCHC RBC AUTO-ENTMCNC: 33.4 G/DL (ref 30–36.5)
MCV RBC AUTO: 93.1 FL (ref 80–99)
MONOCYTES # BLD: 0.6 K/UL (ref 0–1)
MONOCYTES NFR BLD: 9 % (ref 5–13)
NEUTS SEG # BLD: 5.2 K/UL (ref 1.8–8)
NEUTS SEG NFR BLD: 75 % (ref 32–75)
NRBC # BLD: 0 K/UL (ref 0–0.01)
NRBC BLD-RTO: 0 PER 100 WBC
PLATELET # BLD AUTO: 172 K/UL (ref 150–400)
PMV BLD AUTO: 10.7 FL (ref 8.9–12.9)
POTASSIUM SERPL-SCNC: 4.1 MMOL/L (ref 3.5–5.1)
RBC # BLD AUTO: 3.31 M/UL (ref 3.8–5.2)
RBC MORPH BLD: ABNORMAL
SODIUM SERPL-SCNC: 136 MMOL/L (ref 136–145)
WBC # BLD AUTO: 7 K/UL (ref 3.6–11)

## 2020-11-18 PROCEDURE — 74018 RADEX ABDOMEN 1 VIEW: CPT

## 2020-11-18 PROCEDURE — 51798 US URINE CAPACITY MEASURE: CPT

## 2020-11-18 PROCEDURE — 80048 BASIC METABOLIC PNL TOTAL CA: CPT

## 2020-11-18 PROCEDURE — 74011250636 HC RX REV CODE- 250/636: Performed by: SURGERY

## 2020-11-18 PROCEDURE — 74011250636 HC RX REV CODE- 250/636: Performed by: NURSE PRACTITIONER

## 2020-11-18 PROCEDURE — 65270000032 HC RM SEMIPRIVATE

## 2020-11-18 PROCEDURE — 94640 AIRWAY INHALATION TREATMENT: CPT

## 2020-11-18 PROCEDURE — 74011250637 HC RX REV CODE- 250/637: Performed by: SURGERY

## 2020-11-18 PROCEDURE — 74011000250 HC RX REV CODE- 250: Performed by: SURGERY

## 2020-11-18 PROCEDURE — 85025 COMPLETE CBC W/AUTO DIFF WBC: CPT

## 2020-11-18 PROCEDURE — 36415 COLL VENOUS BLD VENIPUNCTURE: CPT

## 2020-11-18 PROCEDURE — 99024 POSTOP FOLLOW-UP VISIT: CPT | Performed by: NURSE PRACTITIONER

## 2020-11-18 RX ORDER — SODIUM CHLORIDE 9 MG/ML
50 INJECTION, SOLUTION INTRAVENOUS CONTINUOUS
Status: DISCONTINUED | OUTPATIENT
Start: 2020-11-18 | End: 2020-11-22 | Stop reason: HOSPADM

## 2020-11-18 RX ORDER — HYDROMORPHONE HYDROCHLORIDE 2 MG/1
2 TABLET ORAL
Status: DISCONTINUED | OUTPATIENT
Start: 2020-11-18 | End: 2020-11-18

## 2020-11-18 RX ADMIN — PANTOPRAZOLE SODIUM 40 MG: 40 TABLET, DELAYED RELEASE ORAL at 07:41

## 2020-11-18 RX ADMIN — ALVIMOPAN 12 MG: 12 CAPSULE ORAL at 09:08

## 2020-11-18 RX ADMIN — IPRATROPIUM BROMIDE 0.5 MG: 0.5 SOLUTION RESPIRATORY (INHALATION) at 09:44

## 2020-11-18 RX ADMIN — ESCITALOPRAM OXALATE 10 MG: 10 TABLET ORAL at 23:09

## 2020-11-18 RX ADMIN — SODIUM CHLORIDE 500 ML: 900 INJECTION, SOLUTION INTRAVENOUS at 19:32

## 2020-11-18 RX ADMIN — ALVIMOPAN 12 MG: 12 CAPSULE ORAL at 19:31

## 2020-11-18 RX ADMIN — BUDESONIDE 500 MCG: 0.5 INHALANT RESPIRATORY (INHALATION) at 20:54

## 2020-11-18 RX ADMIN — ARFORMOTEROL TARTRATE 15 MCG: 15 SOLUTION RESPIRATORY (INHALATION) at 10:08

## 2020-11-18 RX ADMIN — IPRATROPIUM BROMIDE 0.5 MG: 0.5 SOLUTION RESPIRATORY (INHALATION) at 13:57

## 2020-11-18 RX ADMIN — OXYCODONE HYDROCHLORIDE 10 MG: 5 TABLET ORAL at 10:05

## 2020-11-18 RX ADMIN — IPRATROPIUM BROMIDE 0.5 MG: 0.5 SOLUTION RESPIRATORY (INHALATION) at 02:03

## 2020-11-18 RX ADMIN — ATORVASTATIN CALCIUM 10 MG: 10 TABLET, FILM COATED ORAL at 23:08

## 2020-11-18 RX ADMIN — ONDANSETRON 4 MG: 4 TABLET, ORALLY DISINTEGRATING ORAL at 19:51

## 2020-11-18 RX ADMIN — ACETAMINOPHEN 1000 MG: 500 TABLET ORAL at 07:41

## 2020-11-18 RX ADMIN — BUDESONIDE 500 MCG: 0.5 INHALANT RESPIRATORY (INHALATION) at 10:08

## 2020-11-18 RX ADMIN — IPRATROPIUM BROMIDE 0.5 MG: 0.5 SOLUTION RESPIRATORY (INHALATION) at 20:54

## 2020-11-18 RX ADMIN — ACETAMINOPHEN 1000 MG: 500 TABLET ORAL at 19:31

## 2020-11-18 RX ADMIN — ONDANSETRON 4 MG: 4 TABLET, ORALLY DISINTEGRATING ORAL at 09:41

## 2020-11-18 RX ADMIN — HYDROMORPHONE HYDROCHLORIDE 1 MG: 1 INJECTION, SOLUTION INTRAMUSCULAR; INTRAVENOUS; SUBCUTANEOUS at 13:28

## 2020-11-18 RX ADMIN — ARFORMOTEROL TARTRATE 15 MCG: 15 SOLUTION RESPIRATORY (INHALATION) at 20:53

## 2020-11-18 RX ADMIN — HYDROMORPHONE HYDROCHLORIDE 1 MG: 1 INJECTION, SOLUTION INTRAMUSCULAR; INTRAVENOUS; SUBCUTANEOUS at 23:08

## 2020-11-18 NOTE — PROGRESS NOTES
Bedside shift change report given to Wili virgen RN (oncoming nurse) by Kevon Schmitz RN (offgoing nurse). Report included the following information SBAR, Kardex, Procedure Summary, Intake/Output, MAR and Recent Results.

## 2020-11-18 NOTE — PROGRESS NOTES
RN went into the patient's room to ask her if she had voided yet. Patient stated that she had not but is having problems controlling her bowels and is having repeated small accidents. Patient will walk around the hallway in order to stimulate the bladder. Also, NP Judieth Fleischer stated that we will not give an antidiarrheal at this moment. RN spoke to MD Koch since the patient had not voided and her bladder scan was 129mL at 1800. He ordered 500mL bolus and 50mL of continuous fluids (both normal saline).

## 2020-11-18 NOTE — PROGRESS NOTES
Transition of Care: TBD; likely home with family/followup with specialist/PCP     Transport Plan: in car with  Fide Matthew [phone]     RUR: 14%     Discharge pending:  -patient is POD#2 of laparscopic right colectomy  -pending medical progress  -pending KUB results (to be done today)    CM noted from chart and in IDR rounds    CM following  Demarcus Wilde RN, CRM

## 2020-11-18 NOTE — PROGRESS NOTES
General Surgery Daily Progress Note    Admit Date: 2020  Post-Operative Day: 2 Days Post-Op from Procedure(s):  LAPAROSCOPIC RIGHT COLECTOMY (E R A S)     Subjective:     Last 24 hrs: Pt is feeling nauseated this am and pily isn't taking care of the pain; pt ate 50% of her breakfast.         Objective:     Blood pressure 100/66, pulse 81, temperature 97.5 °F (36.4 °C), resp. rate 18, height 5' 5\" (1.651 m), weight 73.2 kg (161 lb 4.8 oz), SpO2 96 %, not currently breastfeeding. Temp (24hrs), Av.2 °F (36.8 °C), Min:97.5 °F (36.4 °C), Max:99 °F (37.2 °C)      _____________________  Physical Exam:     Alert and Oriented, x3, in no acute distress. Cardiovascular: RRR, no peripheral edema  Abdomen: distended w/ BS, drsgs c/d/i      Assessment:   Active Problems:    Mass of hepatic flexure of colon (2020)            Plan: Will get KUB to r/o ileus  Cont diet for now as tolerated  Change pily to dilaudid  ERAS protocol    Data Review:    Recent Labs     20  0510 20  0257   WBC 7.0 7.3   HGB 10.3* 10.1*   HCT 30.8* 30.3*    168     Recent Labs     20  0510 20  0257    137   K 4.1 4.2    105   CO2 29 25    95   BUN 16 14   CREA 0.83 0.95   CA 8.9 8.6   MG  --  2.2   PHOS  --  3.3     No results for input(s): AML, LPSE in the last 72 hours.         ______________________  Medications:    Current Facility-Administered Medications   Medication Dose Route Frequency    HYDROmorphone (DILAUDID) tablet 2 mg  2 mg Oral Q4H PRN    albuterol-ipratropium (DUO-NEB) 2.5 MG-0.5 MG/3 ML  3 mL Nebulization Q6H PRN    escitalopram oxalate (LEXAPRO) tablet 10 mg  10 mg Oral DAILY    gabapentin (NEURONTIN) capsule 300 mg  300 mg Oral QHS    guaiFENesin (ORGANIDIN) tablet 400 mg  400 mg Oral Q6H PRN    atorvastatin (LIPITOR) tablet 10 mg  10 mg Oral DAILY    pantoprazole (PROTONIX) tablet 40 mg  40 mg Oral ACB    albuterol (PROVENTIL VENTOLIN) nebulizer solution 2.5 mg  2.5 mg Inhalation Q6H PRN    ipratropium (ATROVENT) 0.02 % nebulizer solution 0.5 mg  500 mcg Nebulization Q6H RT    acetaminophen (TYLENOL) tablet 1,000 mg  1,000 mg Oral Q6H    alvimopan (ENTEREG) capsule 12 mg  12 mg Oral BID    naloxone (NARCAN) injection 0.4 mg  0.4 mg IntraVENous PRN    lidocaine 8 mg/mL (Xylocaine) infusion  1 mg/kg/hr (Ideal) IntraVENous CONTINUOUS    ondansetron (ZOFRAN ODT) tablet 4 mg  4 mg Oral Q4H PRN    arformoteroL (BROVANA) neb solution 15 mcg  15 mcg Nebulization BID RT    And    budesonide (PULMICORT) 500 mcg/2 ml nebulizer suspension  500 mcg Nebulization BID RT    HYDROmorphone (PF) (DILAUDID) injection 1 mg  1 mg IntraVENous Q4H PRN       Andrea Lindsey NP  11/18/2020

## 2020-11-18 NOTE — PROGRESS NOTES
Comprehensive Nutrition Assessment    Type and Reason for Visit: Positive nutrition screen    Nutrition Recommendations/Plan:    1. Diet advancement per surgeon to: regular, low fiber  2. Continue supplements once diet advanced    Nutrition Assessment:    Pt admitted for colon mass. PMHx: lung and breast CA, DM, HTN, lupus. S/p open colectomy on 11/16. Nausea today with pain control an issue. KUB pending to rule out ileus. Pt visited today with . She repots eating fairly well at home PTA with no wt loss. Notes pain still an issue today. Will follow for results of KUB/diet advancement and diet education when closer to discharge and more appropriate. Malnutrition Assessment:  Malnutrition Status:  No malnutrition      Nutritionally Significant Medications: entereg, lexapro, protonix; PRN zofran    Estimated Daily Nutrient Needs:  Energy (kcal): 1592-1715kcal(MSj x 1.3-1.4); Weight Used for Energy Requirements: Usual(70kg)  Protein (g): 84-98g (1.2-1.4g/kg);  Weight Used for Protein Requirements: Usual(70kg)  Fluid (ml/day): 1700 ; Method Used for Fluid Requirements: 1 ml/kcal    Nutrition Related Findings:       BM: 11/18 - watery  Edema: none  Wounds:  Surgical incision(midline)       Current Nutrition Therapies:   Diet: regular, low fiber  Supplements: Ensure Enlive BID   Meal intake:   Patient Vitals for the past 168 hrs:   % Diet Eaten   11/18/20 1013 50 %   11/17/20 1143 50 %     Anthropometric Measures:  · Height:  5' 5\" (165.1 cm)  · Current Body Wt:  70 kg (154 lb 5.2 oz)   · Admission Body Wt:  154 lb 5.2 oz(PAT)    · Usual Body Wt:        · Ideal Body Wt:   :  123.5 %   Wt Readings from Last 10 Encounters:   11/17/20 73.2 kg (161 lb 4.8 oz)   11/09/20 70.4 kg (155 lb 3.3 oz)   11/04/20 70.3 kg (155 lb)   10/15/20 68.5 kg (151 lb)   08/31/20 69.9 kg (154 lb)   01/31/13 23.1 kg (51 lb)   04/28/11 83 kg (183 lb)   04/21/11 83 kg (183 lb)     Nutrition Diagnosis:   · Inadequate oral intake related to altered GI function as evidenced by nausea(s/p colectomy)    Nutrition Interventions:   Food and/or Nutrient Delivery: Continue NPO  Nutrition Education and Counseling: Education needed  Coordination of Nutrition Care: Continue to monitor while inpatient    Goals:  Diet advancement in 1-2 days with consumption of at least 75% meal       Nutrition Monitoring and Evaluation:   Behavioral-Environmental Outcomes: None identified  Food/Nutrient Intake Outcomes: Supplement intake, Food and nutrient intake  Physical Signs/Symptoms Outcomes: GI status, Weight    Discharge Planning:     Too soon to determine     Eugenio Lane, RD  [address] , Pager #[phone] or via [email or web address]

## 2020-11-18 NOTE — PROGRESS NOTES
Physical Therapy Screening:  Services are not indicated at this time. An InBasket screening referral was triggered for physical therapy based on results obtained during the nursing admission assessment. The patients chart was reviewed and the patient is not appropriate for a skilled therapy evaluation at this time. Please consult physical therapy if any therapy needs arise. Thank you.     Nila Velásquez, PT

## 2020-11-19 ENCOUNTER — APPOINTMENT (OUTPATIENT)
Dept: CT IMAGING | Age: 70
DRG: 330 | End: 2020-11-19
Attending: PHYSICIAN ASSISTANT
Payer: MEDICARE

## 2020-11-19 ENCOUNTER — APPOINTMENT (OUTPATIENT)
Dept: GENERAL RADIOLOGY | Age: 70
DRG: 330 | End: 2020-11-19
Attending: SURGERY
Payer: MEDICARE

## 2020-11-19 LAB
ANION GAP SERPL CALC-SCNC: 5 MMOL/L (ref 5–15)
BASOPHILS # BLD: 0.1 K/UL (ref 0–0.1)
BASOPHILS NFR BLD: 1 % (ref 0–1)
BUN SERPL-MCNC: 20 MG/DL (ref 6–20)
BUN/CREAT SERPL: 26 (ref 12–20)
CALCIUM SERPL-MCNC: 9.2 MG/DL (ref 8.5–10.1)
CHLORIDE SERPL-SCNC: 104 MMOL/L (ref 97–108)
CO2 SERPL-SCNC: 25 MMOL/L (ref 21–32)
CREAT SERPL-MCNC: 0.77 MG/DL (ref 0.55–1.02)
DIFFERENTIAL METHOD BLD: ABNORMAL
EOSINOPHIL # BLD: 0.3 K/UL (ref 0–0.4)
EOSINOPHIL NFR BLD: 4 % (ref 0–7)
ERYTHROCYTE [DISTWIDTH] IN BLOOD BY AUTOMATED COUNT: 14.2 % (ref 11.5–14.5)
GLUCOSE BLD STRIP.AUTO-MCNC: 171 MG/DL (ref 65–100)
GLUCOSE BLD STRIP.AUTO-MCNC: 84 MG/DL (ref 65–100)
GLUCOSE SERPL-MCNC: 98 MG/DL (ref 65–100)
HCT VFR BLD AUTO: 34.3 % (ref 35–47)
HGB BLD-MCNC: 11.4 G/DL (ref 11.5–16)
IMM GRANULOCYTES # BLD AUTO: 0.1 K/UL (ref 0–0.04)
IMM GRANULOCYTES NFR BLD AUTO: 1 % (ref 0–0.5)
LYMPHOCYTES # BLD: 0.8 K/UL (ref 0.8–3.5)
LYMPHOCYTES NFR BLD: 10 % (ref 12–49)
MCH RBC QN AUTO: 30.6 PG (ref 26–34)
MCHC RBC AUTO-ENTMCNC: 33.2 G/DL (ref 30–36.5)
MCV RBC AUTO: 92.2 FL (ref 80–99)
MONOCYTES # BLD: 0.8 K/UL (ref 0–1)
MONOCYTES NFR BLD: 10 % (ref 5–13)
NEUTS SEG # BLD: 5.9 K/UL (ref 1.8–8)
NEUTS SEG NFR BLD: 74 % (ref 32–75)
NRBC # BLD: 0 K/UL (ref 0–0.01)
NRBC BLD-RTO: 0 PER 100 WBC
PLATELET # BLD AUTO: 180 K/UL (ref 150–400)
PLATELET COMMENTS,PCOM: ABNORMAL
PMV BLD AUTO: 10.5 FL (ref 8.9–12.9)
POTASSIUM SERPL-SCNC: 4.2 MMOL/L (ref 3.5–5.1)
RBC # BLD AUTO: 3.72 M/UL (ref 3.8–5.2)
RBC MORPH BLD: ABNORMAL
SERVICE CMNT-IMP: ABNORMAL
SERVICE CMNT-IMP: NORMAL
SODIUM SERPL-SCNC: 134 MMOL/L (ref 136–145)
WBC # BLD AUTO: 8 K/UL (ref 3.6–11)

## 2020-11-19 PROCEDURE — 74011250636 HC RX REV CODE- 250/636: Performed by: NURSE PRACTITIONER

## 2020-11-19 PROCEDURE — 74011000250 HC RX REV CODE- 250: Performed by: SURGERY

## 2020-11-19 PROCEDURE — 65270000032 HC RM SEMIPRIVATE

## 2020-11-19 PROCEDURE — 82962 GLUCOSE BLOOD TEST: CPT

## 2020-11-19 PROCEDURE — 74011000250 HC RX REV CODE- 250: Performed by: PHYSICIAN ASSISTANT

## 2020-11-19 PROCEDURE — 74011250637 HC RX REV CODE- 250/637: Performed by: SURGERY

## 2020-11-19 PROCEDURE — 94640 AIRWAY INHALATION TREATMENT: CPT

## 2020-11-19 PROCEDURE — 3E0336Z INTRODUCTION OF NUTRITIONAL SUBSTANCE INTO PERIPHERAL VEIN, PERCUTANEOUS APPROACH: ICD-10-PCS | Performed by: SURGERY

## 2020-11-19 PROCEDURE — 74018 RADEX ABDOMEN 1 VIEW: CPT

## 2020-11-19 PROCEDURE — 80048 BASIC METABOLIC PNL TOTAL CA: CPT

## 2020-11-19 PROCEDURE — 74176 CT ABD & PELVIS W/O CONTRAST: CPT

## 2020-11-19 PROCEDURE — 74011250636 HC RX REV CODE- 250/636: Performed by: PHYSICIAN ASSISTANT

## 2020-11-19 PROCEDURE — 74011250637 HC RX REV CODE- 250/637: Performed by: PHYSICIAN ASSISTANT

## 2020-11-19 PROCEDURE — 36415 COLL VENOUS BLD VENIPUNCTURE: CPT

## 2020-11-19 PROCEDURE — 0D9670Z DRAINAGE OF STOMACH WITH DRAINAGE DEVICE, VIA NATURAL OR ARTIFICIAL OPENING: ICD-10-PCS | Performed by: SURGERY

## 2020-11-19 PROCEDURE — 85025 COMPLETE CBC W/AUTO DIFF WBC: CPT

## 2020-11-19 PROCEDURE — 74011000258 HC RX REV CODE- 258: Performed by: PHYSICIAN ASSISTANT

## 2020-11-19 RX ORDER — CALCIUM CARBONATE 200(500)MG
200 TABLET,CHEWABLE ORAL
Status: DISCONTINUED | OUTPATIENT
Start: 2020-11-19 | End: 2020-11-22 | Stop reason: HOSPADM

## 2020-11-19 RX ADMIN — CALCIUM CARBONATE (ANTACID) CHEW TAB 500 MG 200 MG: 500 CHEW TAB at 16:55

## 2020-11-19 RX ADMIN — HYDROMORPHONE HYDROCHLORIDE 1 MG: 1 INJECTION, SOLUTION INTRAMUSCULAR; INTRAVENOUS; SUBCUTANEOUS at 21:19

## 2020-11-19 RX ADMIN — ESCITALOPRAM OXALATE 10 MG: 10 TABLET ORAL at 09:09

## 2020-11-19 RX ADMIN — HYDROMORPHONE HYDROCHLORIDE 1 MG: 1 INJECTION, SOLUTION INTRAMUSCULAR; INTRAVENOUS; SUBCUTANEOUS at 09:16

## 2020-11-19 RX ADMIN — ACETAMINOPHEN 1000 MG: 500 TABLET ORAL at 02:21

## 2020-11-19 RX ADMIN — IPRATROPIUM BROMIDE 0.5 MG: 0.5 SOLUTION RESPIRATORY (INHALATION) at 12:36

## 2020-11-19 RX ADMIN — BENZOCAINE AND MENTHOL 1 LOZENGE: 15; 3.6 LOZENGE ORAL at 21:19

## 2020-11-19 RX ADMIN — BENZOCAINE AND MENTHOL 1 LOZENGE: 15; 3.6 LOZENGE ORAL at 16:55

## 2020-11-19 RX ADMIN — CALCIUM CARBONATE (ANTACID) CHEW TAB 500 MG 200 MG: 500 CHEW TAB at 12:21

## 2020-11-19 RX ADMIN — ONDANSETRON 4 MG: 4 TABLET, ORALLY DISINTEGRATING ORAL at 12:21

## 2020-11-19 RX ADMIN — ATORVASTATIN CALCIUM 10 MG: 10 TABLET, FILM COATED ORAL at 09:09

## 2020-11-19 RX ADMIN — GABAPENTIN 300 MG: 300 CAPSULE ORAL at 21:19

## 2020-11-19 RX ADMIN — PANTOPRAZOLE SODIUM 40 MG: 40 TABLET, DELAYED RELEASE ORAL at 07:30

## 2020-11-19 RX ADMIN — HYDROMORPHONE HYDROCHLORIDE 1 MG: 1 INJECTION, SOLUTION INTRAMUSCULAR; INTRAVENOUS; SUBCUTANEOUS at 04:44

## 2020-11-19 RX ADMIN — ACETAMINOPHEN 1000 MG: 500 TABLET ORAL at 07:30

## 2020-11-19 RX ADMIN — CALCIUM GLUCONATE: 94 INJECTION, SOLUTION INTRAVENOUS at 18:44

## 2020-11-19 NOTE — PROGRESS NOTES
While walking back to the bathroom the patient belched several times. Then she vomited 500cc of light brown emesis and had diarrhea at the same time that was light yellow/brown. RN gave Zofran after this and got he patient settled.  Will notify MD.    2110: MD Juan Luis Cuello stated to insert an NG tube, RN passed this along to nightshift

## 2020-11-19 NOTE — PROGRESS NOTES
Bedside shift change report given to Giorgi Blackburn RN (oncoming nurse) by Garret Theodore RN (offgoing nurse). Report included the following information SBAR, Kardex, Intake/Output and MAR.

## 2020-11-19 NOTE — PROGRESS NOTES
Surgery Progress Note    Admit Date: 11/16/2020      Subjective:      Pt complains of vomiting overnight, 500 cc bilious emesis, NGT placed and adjusted per KUB, she still c/o bloating and pain,  Pts pain present - adequately treated. No SOB. No CP. Pt is ambulating. Patient 's current diet is NPO. Collette Ruts Pt reports  nausea and no vomiting. Pt reports no fever or chills    Bowel Movements: no flatus and no true bowel movements, she tells me she is having water output via rectum -- not able to control it         Objective:     Patient Vitals for the past 8 hrs:   BP Pulse Resp SpO2   11/19/20 0200 131/73 88 16 99 %     No intake/output data recorded. 11/17 1901 - 11/19 0700  In: 180 [P.O.:180]  Out: 2600 [Urine:2600]ip  Physical Exam:    General: alert, cooperative, no distress  Cardiac: normal S1 and S2  Lungs: Normal chest wall and respirations. Clear to auscultation.   Abdomen: soft, distended, high pitched bowel sounds, tenderness mild and moderate - in the lower abdomen and in the upper abdomen, without guarding, without rebound, no peritoneal signs,   Wounds:staples in place, large patch of eccymosis LLQ tracking to left flank   Neuro: alert, oriented x 3, no defects noted in general exam.  Extremities: no edema      CBC:   Lab Results   Component Value Date/Time    WBC 8.0 11/19/2020 05:10 AM    RBC 3.72 (L) 11/19/2020 05:10 AM    HGB 11.4 (L) 11/19/2020 05:10 AM    HCT 34.3 (L) 11/19/2020 05:10 AM    PLATELET 030 92/15/6220 05:10 AM     BMP:   Lab Results   Component Value Date/Time    Glucose 98 11/19/2020 05:10 AM    Sodium 134 (L) 11/19/2020 05:10 AM    Potassium 4.2 11/19/2020 05:10 AM    Chloride 104 11/19/2020 05:10 AM    CO2 25 11/19/2020 05:10 AM    BUN 20 11/19/2020 05:10 AM    Creatinine 0.77 11/19/2020 05:10 AM    Calcium 9.2 11/19/2020 05:10 AM     CMP:  Lab Results   Component Value Date/Time    Glucose 98 11/19/2020 05:10 AM    Sodium 134 (L) 11/19/2020 05:10 AM    Potassium 4.2 11/19/2020 05:10 AM Chloride 104 11/19/2020 05:10 AM    CO2 25 11/19/2020 05:10 AM    BUN 20 11/19/2020 05:10 AM    Creatinine 0.77 11/19/2020 05:10 AM    Calcium 9.2 11/19/2020 05:10 AM    Anion gap 5 11/19/2020 05:10 AM    BUN/Creatinine ratio 26 (H) 11/19/2020 05:10 AM    Alk.  phosphatase 88 11/09/2020 03:45 PM    Protein, total 7.1 11/09/2020 03:45 PM    Albumin 3.6 11/09/2020 03:45 PM    Globulin 3.5 11/09/2020 03:45 PM    A-G Ratio 1.0 (L) 11/09/2020 03:45 PM       Radiology review: KUB with AFL, ngt advanced to 65 cm         Assessment:   Pt is POD #3 s/p Procedure(s):  LAPAROSCOPIC RIGHT COLECTOMY (E R A S)    Post op SBO  Plan:   Diet: NPO and NGT, bowel rest, start PPN for nutritional support , IV hydration   Nutrition consult   Activity: walk in lucio  Pain management  GI and DVT prophylaxis  Meds: discontinue entereg--   Labs: labs are reviewed, up to date and unremarkable   CT A/P now to evaluate for SBO, continue bowel rest,   Further plan per Dr. Ashvin Rock PA-C

## 2020-11-19 NOTE — PROGRESS NOTES
Transition of Care: TBD; likely home with family/followup with specialist/PCP     Transport Plan: in car with  Les Garcia [phone]     RUR: 14%     Discharge pending:  -patient is POD#3 of laparscopic right colectomy  -pending medical progress and care recommendations  -patient got NG tube placed today 11/19  -patient is NPO and started on PPN today 11/19     CM noted from chart and in [facility] rounds     CM following   Roc Rodriguez RN, CRM

## 2020-11-19 NOTE — CONSULTS
Comprehensive Nutrition Assessment    Type and Reason for Visit: Consult    Nutrition Recommendations/Plan:    1. Access port for full TPN if to continue NPO status:   - increase to 5%AA, D15 @ 75ml/hr + 500ml, 20% lipids 3x/week. 2. Diet advancement per surgery    Nutrition Assessment:    Pt admitted for colon mass. PMHx: lung and breast CA, DM, HTN, lupus. S/p open colectomy on 11/16. Nausea/vomiting with NGT placed. Abd CT today. Plans for PPN today with possible transition to TPN via existing port if needed. Discussed with NP. Pt visited today with . Discussed plan for PPN. 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 No questions at this time. Notes pain still an issue today. Will follow diet education when closer to discharge and more appropriate. If remains NPO tomorrow recommend accessing port for full TPN. Recommend:   5%AA, D15 @ 75ml/hr + 500ml, 20% lipids 3x/week. Provides: 1706kcal, 90g protein, 270g CHO (GIR =2.64mg/kg/min), 1800-2300ml fluid. Meets 100% energy and protein needs. Low risk for refeeding with pt reporting eating well at home. Malnutrition Assessment:  Malnutrition Status:  No malnutrition      Nutritionally Significant Medications: tums, lexapro, protonix, NS @ 100ml/hr    Estimated Daily Nutrient Needs:  Energy (kcal): 1592-1715kcal(MSj x 1.3-1.4); Weight Used for Energy Requirements: Usual(70kg)  Protein (g): 84-98g (1.2-1.4g/kg);  Weight Used for Protein Requirements: Usual(70kg)  Fluid (ml/day): 1700 ; Method Used for Fluid Requirements: 1 ml/kcal    Nutrition Related Findings:       BM: 11/18 - watery, emesis 11/18  Edema: none  Wounds:  Surgical incision(midline)       Current Nutrition Therapies:   Diet: NPO  PPN: 4.25%AA, D10 @ 42ml/hr  Meal intake:   Patient Vitals for the past 168 hrs:   % Diet Eaten   11/18/20 1013 50 %   11/17/20 1143 50 %     Anthropometric Measures:  · Height:  5' 5\" (165.1 cm)  · Current Body Wt: 70 kg (154 lb 5.2 oz)   · Admission Body Wt:  154 lb 5.2 oz(PAT)    · Usual Body Wt:        · Ideal Body Wt:   :  123.5 %   Wt Readings from Last 10 Encounters:   11/19/20 70.9 kg (156 lb 6.4 oz)   11/09/20 70.4 kg (155 lb 3.3 oz)   11/04/20 70.3 kg (155 lb)   10/15/20 68.5 kg (151 lb)   08/31/20 69.9 kg (154 lb)   01/31/13 23.1 kg (51 lb)   04/28/11 83 kg (183 lb)   04/21/11 83 kg (183 lb)     Nutrition Diagnosis:   · Inadequate oral intake related to altered GI function as evidenced by NPO or clear liquid status due to medical condition, nausea, vomiting    Nutrition Interventions:   Food and/or Nutrient Delivery: Continue NPO  Nutrition Education and Counseling: No recommendations at this time  Coordination of Nutrition Care: Continue to monitor while inpatient, Interdisciplinary rounds    Goals:  PN meeting at least 90% needs or diet advancement in 3-4 days; wt maintenance       Nutrition Monitoring and Evaluation:   Behavioral-Environmental Outcomes: None identified  Food/Nutrient Intake Outcomes: Parenteral nutrition intake/tolerance, Diet advancement/tolerance  Physical Signs/Symptoms Outcomes: GI status, Nausea/vomiting    Discharge Planning:     Too soon to determine     Kristina Jimenez, RD  [address] , Pager #[phone] or via [email or web address]

## 2020-11-19 NOTE — ROUTINE PROCESS
Bedside and Verbal shift change report given to Isa Morales (oncoming nurse) by Catalina Cota (offgoing nurse). Report included the following information SBAR, Kardex, MAR and Recent Results.

## 2020-11-20 LAB
ANION GAP SERPL CALC-SCNC: 3 MMOL/L (ref 5–15)
BUN SERPL-MCNC: 14 MG/DL (ref 6–20)
BUN/CREAT SERPL: 22 (ref 12–20)
CALCIUM SERPL-MCNC: 8.5 MG/DL (ref 8.5–10.1)
CHLORIDE SERPL-SCNC: 108 MMOL/L (ref 97–108)
CO2 SERPL-SCNC: 27 MMOL/L (ref 21–32)
CREAT SERPL-MCNC: 0.63 MG/DL (ref 0.55–1.02)
GLUCOSE BLD STRIP.AUTO-MCNC: 124 MG/DL (ref 65–100)
GLUCOSE BLD STRIP.AUTO-MCNC: 146 MG/DL (ref 65–100)
GLUCOSE BLD STRIP.AUTO-MCNC: 158 MG/DL (ref 65–100)
GLUCOSE SERPL-MCNC: 143 MG/DL (ref 65–100)
POTASSIUM SERPL-SCNC: 3.8 MMOL/L (ref 3.5–5.1)
SERVICE CMNT-IMP: ABNORMAL
SODIUM SERPL-SCNC: 138 MMOL/L (ref 136–145)

## 2020-11-20 PROCEDURE — 74011000250 HC RX REV CODE- 250: Performed by: SURGERY

## 2020-11-20 PROCEDURE — 36415 COLL VENOUS BLD VENIPUNCTURE: CPT

## 2020-11-20 PROCEDURE — 74011000250 HC RX REV CODE- 250: Performed by: NURSE PRACTITIONER

## 2020-11-20 PROCEDURE — 74011250636 HC RX REV CODE- 250/636: Performed by: NURSE PRACTITIONER

## 2020-11-20 PROCEDURE — 94640 AIRWAY INHALATION TREATMENT: CPT

## 2020-11-20 PROCEDURE — 82962 GLUCOSE BLOOD TEST: CPT

## 2020-11-20 PROCEDURE — 74011250637 HC RX REV CODE- 250/637: Performed by: PHYSICIAN ASSISTANT

## 2020-11-20 PROCEDURE — 80048 BASIC METABOLIC PNL TOTAL CA: CPT

## 2020-11-20 PROCEDURE — 74011250636 HC RX REV CODE- 250/636: Performed by: SURGERY

## 2020-11-20 PROCEDURE — 74011000258 HC RX REV CODE- 258: Performed by: NURSE PRACTITIONER

## 2020-11-20 PROCEDURE — 65270000032 HC RM SEMIPRIVATE

## 2020-11-20 PROCEDURE — 74011250637 HC RX REV CODE- 250/637: Performed by: SURGERY

## 2020-11-20 RX ADMIN — ATORVASTATIN CALCIUM 10 MG: 10 TABLET, FILM COATED ORAL at 09:23

## 2020-11-20 RX ADMIN — BUDESONIDE 500 MCG: 0.5 INHALANT RESPIRATORY (INHALATION) at 09:08

## 2020-11-20 RX ADMIN — BUDESONIDE 500 MCG: 0.5 INHALANT RESPIRATORY (INHALATION) at 19:21

## 2020-11-20 RX ADMIN — CALCIUM CARBONATE (ANTACID) CHEW TAB 500 MG 200 MG: 500 CHEW TAB at 07:30

## 2020-11-20 RX ADMIN — CALCIUM GLUCONATE: 94 INJECTION, SOLUTION INTRAVENOUS at 18:25

## 2020-11-20 RX ADMIN — BENZOCAINE AND MENTHOL 1 LOZENGE: 15; 3.6 LOZENGE ORAL at 13:16

## 2020-11-20 RX ADMIN — PANTOPRAZOLE SODIUM 40 MG: 40 TABLET, DELAYED RELEASE ORAL at 07:30

## 2020-11-20 RX ADMIN — CALCIUM CARBONATE (ANTACID) CHEW TAB 500 MG 200 MG: 500 CHEW TAB at 12:18

## 2020-11-20 RX ADMIN — ARFORMOTEROL TARTRATE 15 MCG: 15 SOLUTION RESPIRATORY (INHALATION) at 19:21

## 2020-11-20 RX ADMIN — IPRATROPIUM BROMIDE 0.5 MG: 0.5 SOLUTION RESPIRATORY (INHALATION) at 09:08

## 2020-11-20 RX ADMIN — CALCIUM CARBONATE (ANTACID) CHEW TAB 500 MG 200 MG: 500 CHEW TAB at 16:56

## 2020-11-20 RX ADMIN — IPRATROPIUM BROMIDE 0.5 MG: 0.5 SOLUTION RESPIRATORY (INHALATION) at 19:21

## 2020-11-20 RX ADMIN — ESCITALOPRAM OXALATE 10 MG: 10 TABLET ORAL at 09:23

## 2020-11-20 RX ADMIN — GABAPENTIN 300 MG: 300 CAPSULE ORAL at 22:37

## 2020-11-20 RX ADMIN — BENZOCAINE AND MENTHOL 1 LOZENGE: 15; 3.6 LOZENGE ORAL at 17:24

## 2020-11-20 RX ADMIN — ARFORMOTEROL TARTRATE 15 MCG: 15 SOLUTION RESPIRATORY (INHALATION) at 09:08

## 2020-11-20 RX ADMIN — IPRATROPIUM BROMIDE 0.5 MG: 0.5 SOLUTION RESPIRATORY (INHALATION) at 14:18

## 2020-11-20 RX ADMIN — GUAIFENESIN 400 MG: 200 TABLET ORAL at 17:54

## 2020-11-20 RX ADMIN — BENZOCAINE AND MENTHOL 1 LOZENGE: 15; 3.6 LOZENGE ORAL at 10:11

## 2020-11-20 RX ADMIN — HYDROMORPHONE HYDROCHLORIDE 1 MG: 1 INJECTION, SOLUTION INTRAMUSCULAR; INTRAVENOUS; SUBCUTANEOUS at 18:37

## 2020-11-20 RX ADMIN — SODIUM CHLORIDE 100 ML/HR: 900 INJECTION, SOLUTION INTRAVENOUS at 15:19

## 2020-11-20 NOTE — PROGRESS NOTES
Progress Note    Patient: Eliza Weber MRN: [id number]  SSN: [id number]    YOB: 1950  Age: 72 y.o. Sex: female      Admit Date: 2020    4 Days Post-Op    Procedure:  Procedure(s):  LAPAROSCOPIC RIGHT COLECTOMY (E R A S)    Subjective:     Patient notes less bloating, passing gas/loose bowel movements; pain well controlled; ambulating in room. .     Objective:     Visit Vitals  BP (!) 110/58   Pulse 72   Temp 98.2 °F (36.8 °C)   Resp 18   Ht 5' 5\" (1.651 m)   Wt 76.6 kg (168 lb 14 oz)   SpO2 93%   Breastfeeding No   BMI 28.10 kg/m²       Temp (24hrs), Av.4 °F (36.9 °C), Min:98.2 °F (36.8 °C), Max:98.6 °F (37 °C)    Date 20 - 2059 20 - 20 0659   Shift 2879-6758 3674-1343 24 Hour Total 4886-2154 1319-8977 24 Hour Total   INTAKE   Shift Total(mL/kg)         OUTPUT   Urine(mL/kg/hr) 150(0.2)  150(0.1)        Urine Voided 150  150        Urine Occurrence(s)  400 x 400 x      Emesis/NG output 700  700        Output (ml) (Nasogastric Tube 20) 700  700      Shift Total(mL/kg) 850(12)  850(12)      NET -850  -850      Weight (kg) 70.9 70.9 70.9 76.6 76.6 76.6       Physical Exam:    ABDOMEN: soft, less distended; wounds dry and intact with persistent jackie-incisional bruising worse at LLQ 12 mm site; appropriate incisional pain with palpation.     Data Review: VS, I/O's, Labs    Lab Review:   Recent Results (from the past 12 hour(s))   GLUCOSE, POC    Collection Time: 20 11:13 PM   Result Value Ref Range    Glucose (POC) 171 (H) 65 - 100 mg/dL    Performed by Stephanie Hernandes    GLUCOSE, POC    Collection Time: 20  6:21 AM   Result Value Ref Range    Glucose (POC) 124 (H) 65 - 100 mg/dL    Performed by Stephanie Hernandes          Assessment:     Hospital Problems  Date Reviewed: 2020          Codes Class Noted POA    Mass of hepatic flexure of colon ICD-10-CM: K63.89  ICD-9-CM: 569.89  2020 Unknown            Low output NGT, bowel function returning. Plan/Recommendations/Medical Decision Making:     Remove NGT. Start clear liquids. Ambulation, spirometry. Ice pack to LLQ. Ambulation, spirometry. Labs pending; will renew peripheral TPN.

## 2020-11-20 NOTE — ROUTINE PROCESS
Bedside and Verbal shift change report given to Felipa Jackson (oncoming nurse) by Edward Lamas (offgoing nurse). Report included the following information SBAR, Kardex, MAR and Recent Results.

## 2020-11-20 NOTE — PROGRESS NOTES
Bedside shift change report given to Rossana Sam (oncoming nurse) by Veronica Berg RN (offgoing nurse). Report included the following information SBAR, Kardex, Intake/Output and MAR.

## 2020-11-20 NOTE — DISCHARGE INSTRUCTIONS
Patient Education     Future Appointments   Date Time Provider Ruthy Franco   12/3/2020 10:40 AM Maldonado Laird NP [facility] BS AMB       Laparoscopic Bowel Resection: What to Expect at [address] [address] had part of your small or large intestine taken out. You are likely to have pain that comes and goes for the next few days. You may feel like you have the flu. You also may have a low fever and feel tired and nauseated. This is common. You should feel better after 1 to 2 weeks and will probably be back to normal in 2 to 4 weeks. Your bowel movements may not be regular for several weeks. Also, you may have some blood in your stool. This care sheet gives you a general idea about how long it will take for you to recover. But each person recovers at a different pace. Follow the steps below to get better as quickly as possible. How can you care for yourself at home? Activity    · Rest when you feel tired. Getting enough sleep will help you recover.     · Try to walk each day. Start by walking a little more than you did the day before. Bit by bit, increase the amount you walk. Walking boosts blood flow and helps prevent pneumonia and constipation.     · Avoid strenuous activities, such as biking, jogging, weight lifting, or aerobic exercise, until your doctor says it is okay.     · Avoid lifting anything that would make you strain. This may include heavy grocery bags and milk containers, a heavy briefcase or backpack, cat litter or dog food bags, a vacuum , or a child.     · Ask your doctor when you can drive again.     · You will probably need to take 2 to 4 weeks off from work. It depends on the type of work you do and how you feel.     · You may shower 24 to 48 hours after surgery, if your doctor says it is okay. Pat the cut (incision) dry. Do not take a bath for the first 2 weeks, or until your doctor tells you it is okay.     · Ask your doctor when it is okay for you to have sex.    Diet    · You may not have much appetite after the surgery. But try to eat a healthy diet. Your doctor will tell you about any foods you should not eat.     · Eat a low-fiber diet for several weeks after surgery. Eat many small meals throughout the day. Add high-fiber foods a little at a time.     · Eat yogurt. It puts good bacteria into your colon and helps prevent diarrhea.     · Try to avoid nuts, seeds, and corn for a while. They may be hard to digest.     · You may need to take vitamins that contain sodium and potassium. Your doctor will tell you whether you should take any vitamins or supplements.     · Drink plenty of fluids (enough so that your urine is light yellow or clear like water) to prevent dehydration. Choose water and other caffeine-free clear liquids until you feel better. If you have kidney, heart, or liver disease and have to limit fluids, talk with your doctor before you increase the amount of fluids you drink. Medicines    · Your doctor will tell you if and when you can restart your medicines. He or she will also give you instructions about taking any new medicines.     · If you take aspirin or some other blood thinner, ask your doctor if and when to start taking it again. Make sure that you understand exactly what your doctor wants you to do.     · Take pain medicines exactly as directed. ? If the doctor gave you a prescription medicine for pain, take it as prescribed. ? If you are not taking a prescription pain medicine, ask your doctor if you can take an over-the-counter medicine.     · If your doctor prescribed antibiotics, take them as directed. Do not stop taking them just because you feel better. You need to take the full course of antibiotics.     · You may need to take some medicines in a different form. You will be told whether to crush pills or take a liquid form of the medicine.     · If your doctor gives you a stool softener, take it as directed.    Incision care    · If you have strips of tape on the incisions, leave the tape on for a week or until it falls off.     · Wash the area daily with warm, soapy water and pat it dry. Don't use hydrogen peroxide or alcohol, which can slow healing. You may cover the area with a gauze bandage if it weeps or rubs against clothing. Change the bandage every day. Follow-up care is a key part of your treatment and safety. Be sure to make and go to all appointments, and call your doctor if you are having problems. It's also a good idea to know your test results and keep a list of the medicines you take. When should you call for help? Call 911 anytime you think you may need emergency care. For example, call if:    · You passed out (lost consciousness).     · You are short of breath. Call your doctor now or seek immediate medical care if:    · You have pain that does not get better after you take pain medicine.     · You cannot pass stool or gas.     · You are sick to your stomach and cannot keep fluids down.     · Bright red blood has soaked through your bandage.     · You have loose stitches, or your incision comes open.     · You have signs of a blood clot in your leg (called a deep vein thrombosis), such as:  ? Pain in your calf, back of the knee, thigh, or groin. ? Redness and swelling in your leg or groin.     · You have signs of infection, such as:  ? Increased pain, swelling, warmth, or redness. ? Red streaks leading from the incision. ? Pus draining from the incision. ? A fever. Watch closely for any changes in your health, and be sure to contact your doctor if you have any problems. Where can you learn more? Go to http://www.gray.com/  Enter H191 in the search box to learn more about \"Laparoscopic Bowel Resection: What to Expect at Home. \"  Current as of: April 15, 2020               Content Version: 12.6  © 8930-4777 [employer].    Care instructions adapted under license by [employer] (which disclaims liability or warranty for this information). If you have questions about a medical condition or this instruction, always ask your healthcare professional. [address] any warranty or liability for your use of this information.

## 2020-11-20 NOTE — ROUTINE PROCESS
Patient stated that even though she has had a bilateral mastectomy that she still gets blood pressures and IV sticks in both arms. She stated that this is fine and has been done before many times and that the only time her arms have swelled was when she was hot in the summer.

## 2020-11-21 LAB
GLUCOSE BLD STRIP.AUTO-MCNC: 127 MG/DL (ref 65–100)
GLUCOSE BLD STRIP.AUTO-MCNC: 128 MG/DL (ref 65–100)
GLUCOSE BLD STRIP.AUTO-MCNC: 143 MG/DL (ref 65–100)
SERVICE CMNT-IMP: ABNORMAL

## 2020-11-21 PROCEDURE — 94760 N-INVAS EAR/PLS OXIMETRY 1: CPT

## 2020-11-21 PROCEDURE — 74011250637 HC RX REV CODE- 250/637: Performed by: PHYSICIAN ASSISTANT

## 2020-11-21 PROCEDURE — 82962 GLUCOSE BLOOD TEST: CPT

## 2020-11-21 PROCEDURE — 74011250637 HC RX REV CODE- 250/637: Performed by: SURGERY

## 2020-11-21 PROCEDURE — 65270000032 HC RM SEMIPRIVATE

## 2020-11-21 PROCEDURE — 74011000250 HC RX REV CODE- 250: Performed by: SURGERY

## 2020-11-21 PROCEDURE — 74011250636 HC RX REV CODE- 250/636: Performed by: SURGERY

## 2020-11-21 PROCEDURE — 94640 AIRWAY INHALATION TREATMENT: CPT

## 2020-11-21 RX ADMIN — CALCIUM CARBONATE (ANTACID) CHEW TAB 500 MG 200 MG: 500 CHEW TAB at 17:49

## 2020-11-21 RX ADMIN — CALCIUM CARBONATE (ANTACID) CHEW TAB 500 MG 200 MG: 500 CHEW TAB at 07:09

## 2020-11-21 RX ADMIN — ESCITALOPRAM OXALATE 10 MG: 10 TABLET ORAL at 09:35

## 2020-11-21 RX ADMIN — ONDANSETRON 4 MG: 4 TABLET, ORALLY DISINTEGRATING ORAL at 17:47

## 2020-11-21 RX ADMIN — IPRATROPIUM BROMIDE 0.5 MG: 0.5 SOLUTION RESPIRATORY (INHALATION) at 01:55

## 2020-11-21 RX ADMIN — ATORVASTATIN CALCIUM 10 MG: 10 TABLET, FILM COATED ORAL at 09:35

## 2020-11-21 RX ADMIN — SODIUM CHLORIDE 100 ML/HR: 900 INJECTION, SOLUTION INTRAVENOUS at 01:18

## 2020-11-21 RX ADMIN — IPRATROPIUM BROMIDE 0.5 MG: 0.5 SOLUTION RESPIRATORY (INHALATION) at 08:35

## 2020-11-21 RX ADMIN — PROCHLORPERAZINE EDISYLATE 10 MG: 5 INJECTION INTRAMUSCULAR; INTRAVENOUS at 21:50

## 2020-11-21 RX ADMIN — ARFORMOTEROL TARTRATE 15 MCG: 15 SOLUTION RESPIRATORY (INHALATION) at 08:35

## 2020-11-21 RX ADMIN — BENZOCAINE AND MENTHOL 1 LOZENGE: 15; 3.6 LOZENGE ORAL at 03:34

## 2020-11-21 RX ADMIN — BUDESONIDE 500 MCG: 0.5 INHALANT RESPIRATORY (INHALATION) at 08:35

## 2020-11-21 RX ADMIN — IPRATROPIUM BROMIDE 0.5 MG: 0.5 SOLUTION RESPIRATORY (INHALATION) at 13:51

## 2020-11-21 RX ADMIN — CALCIUM CARBONATE (ANTACID) CHEW TAB 500 MG 200 MG: 500 CHEW TAB at 12:39

## 2020-11-21 RX ADMIN — PANTOPRAZOLE SODIUM 40 MG: 40 TABLET, DELAYED RELEASE ORAL at 07:09

## 2020-11-21 NOTE — PROGRESS NOTES
Progress Note    Patient: Kami Melissa MRN: [id number]  SSN: [id number]    YOB: 1950  Age: 72 y.o. Sex: female      Admit Date: 2020    5 Days Post-Op    Procedure:  Procedure(s):  LAPAROSCOPIC RIGHT COLECTOMY (E R A S)    Subjective:     Patient feeling much better today. She is tolerating clears liquid diet. Objective:     Visit Vitals  BP (!) 122/55 (BP 1 Location: Right leg, BP Patient Position: At rest)   Pulse 70   Temp 98.2 °F (36.8 °C)   Resp 18   Ht 5' 5\" (1.651 m)   Wt 70.8 kg (156 lb 1.6 oz)   SpO2 95%   Breastfeeding No   BMI 25.98 kg/m²       Temp (24hrs), Av.7 °F (37.1 °C), Min:98.2 °F (36.8 °C), Max:99.1 °F (37.3 °C)      Physical Exam:    Gen- Alert in NAD  Lungs- CTA  H-RRR  Abd- S/incisional tenderness  Mild distension  +BS  Incision intact     Data Review: images and reports reviewed    Lab Review: All lab results for the last 24 hours reviewed. Recent Results (from the past 24 hour(s))   GLUCOSE, POC    Collection Time: 20  4:16 PM   Result Value Ref Range    Glucose (POC) 158 (H) 65 - 100 mg/dL    Performed by Jerri Oh, POC    Collection Time: 20  1:19 AM   Result Value Ref Range    Glucose (POC) 143 (H) 65 - 100 mg/dL    Performed by Lynette Binh    GLUCOSE, POC    Collection Time: 20  7:14 AM   Result Value Ref Range    Glucose (POC) 127 (H) 65 - 100 mg/dL    Performed by Lynette Binh        Assessment:     Hospital Problems  Date Reviewed: 2020          Codes Class Noted POA    Mass of hepatic flexure of colon ICD-10-CM: K63.89  ICD-9-CM: 569.89  2020 Unknown              Plan/Recommendations/Medical Decision Making:   Seems to be improving  Will stop TPN after this bag  Begin GI lite diet. If she is doing ok then hopefully home tomorrow.

## 2020-11-21 NOTE — PROGRESS NOTES
Bedside and Verbal shift change report given to Erin Carvalho RN (oncoming nurse) by Gustabo Galvan RN and Je Jerome RN (offgoing nurse). Report included the following information SBAR, Kardex, Intake/Output and MAR.

## 2020-11-22 VITALS
TEMPERATURE: 98.1 F | RESPIRATION RATE: 16 BRPM | OXYGEN SATURATION: 98 % | WEIGHT: 155.6 LBS | HEIGHT: 65 IN | HEART RATE: 87 BPM | SYSTOLIC BLOOD PRESSURE: 130 MMHG | BODY MASS INDEX: 25.92 KG/M2 | DIASTOLIC BLOOD PRESSURE: 81 MMHG

## 2020-11-22 LAB
GLUCOSE BLD STRIP.AUTO-MCNC: 117 MG/DL (ref 65–100)
SERVICE CMNT-IMP: ABNORMAL

## 2020-11-22 PROCEDURE — 82962 GLUCOSE BLOOD TEST: CPT

## 2020-11-22 PROCEDURE — 74011250636 HC RX REV CODE- 250/636: Performed by: SURGERY

## 2020-11-22 PROCEDURE — 74011250637 HC RX REV CODE- 250/637: Performed by: PHYSICIAN ASSISTANT

## 2020-11-22 PROCEDURE — 74011250637 HC RX REV CODE- 250/637: Performed by: SURGERY

## 2020-11-22 PROCEDURE — 74011000250 HC RX REV CODE- 250: Performed by: SURGERY

## 2020-11-22 PROCEDURE — 94640 AIRWAY INHALATION TREATMENT: CPT

## 2020-11-22 RX ADMIN — BUDESONIDE 500 MCG: 0.5 INHALANT RESPIRATORY (INHALATION) at 08:11

## 2020-11-22 RX ADMIN — PANTOPRAZOLE SODIUM 40 MG: 40 TABLET, DELAYED RELEASE ORAL at 07:03

## 2020-11-22 RX ADMIN — ATORVASTATIN CALCIUM 10 MG: 10 TABLET, FILM COATED ORAL at 08:21

## 2020-11-22 RX ADMIN — ARFORMOTEROL TARTRATE 15 MCG: 15 SOLUTION RESPIRATORY (INHALATION) at 08:11

## 2020-11-22 RX ADMIN — ESCITALOPRAM OXALATE 10 MG: 10 TABLET ORAL at 08:21

## 2020-11-22 RX ADMIN — IPRATROPIUM BROMIDE 0.5 MG: 0.5 SOLUTION RESPIRATORY (INHALATION) at 08:11

## 2020-11-22 RX ADMIN — SODIUM CHLORIDE 50 ML/HR: 900 INJECTION, SOLUTION INTRAVENOUS at 07:05

## 2020-11-22 RX ADMIN — CALCIUM CARBONATE (ANTACID) CHEW TAB 500 MG 200 MG: 500 CHEW TAB at 07:03

## 2020-11-22 NOTE — PROGRESS NOTES
Patient requested AMA forms as she thinks that she will feel better at home and she or  will be able to make food at home that she can tolerate. Spoke to attending physician who thinks it would be better for the patient to stay but if she is adamant about leaving AMA, it is fully in her right. Patient signed AMA form and left 5E unit with her .

## 2020-11-22 NOTE — PROGRESS NOTES
Bedside and Verbal shift change report given to Erin Carvalho RN (oncoming nurse) by Cynthia Cast RN (offgoing nurse). Report included the following information SBAR, Kardex, ED Summary, Intake/Output, MAR and Recent Results.

## 2020-11-22 NOTE — PROGRESS NOTES
Bedside and Verbal shift change report given to Delores Armenta RN (oncoming nurse) by Tab Thorne RN (offgoing nurse). Report included the following information SBAR, Kardex, Intake/Output, MAR and Recent Results.

## 2020-11-22 NOTE — PROGRESS NOTES
Patient complaining of increasing feeling of nausea. Patient received Zofran PO at 1747. Patient stated the medication did not help at all. Nurse paged on call Dr. Jeny BRASWELL and MD ordered PRN compazine and to change patient's diet to clear liquid diet. Patient vomiting when nurse came to give medication. Emesis was brown. Patient stated she was drinking Pepsi. Nurse will continue to monitor patient.

## 2020-12-03 ENCOUNTER — OFFICE VISIT (OUTPATIENT)
Dept: SURGERY | Age: 70
End: 2020-12-03
Payer: MEDICARE

## 2020-12-03 VITALS
SYSTOLIC BLOOD PRESSURE: 88 MMHG | OXYGEN SATURATION: 95 % | RESPIRATION RATE: 16 BRPM | BODY MASS INDEX: 24.93 KG/M2 | WEIGHT: 149.6 LBS | DIASTOLIC BLOOD PRESSURE: 57 MMHG | HEIGHT: 65 IN | HEART RATE: 74 BPM | TEMPERATURE: 97.7 F

## 2020-12-03 DIAGNOSIS — Z90.49 S/P RIGHT COLECTOMY: ICD-10-CM

## 2020-12-03 DIAGNOSIS — Z09 FOLLOW-UP EXAMINATION AFTER ABDOMINAL SURGERY: Primary | ICD-10-CM

## 2020-12-03 DIAGNOSIS — K43.9 HERNIA OF ABDOMINAL WALL: ICD-10-CM

## 2020-12-03 PROCEDURE — 99024 POSTOP FOLLOW-UP VISIT: CPT | Performed by: NURSE PRACTITIONER

## 2020-12-03 NOTE — PATIENT INSTRUCTIONS
You have a hernia at the small incision on the left \"the bulge\" area     Appt with Franky Bloch, MD Thursday 12/10/20 at 1:40pm     If the area is bothering you, get off your feet and try and relax your abdominal muscles, gentle massage is ok to     Splint the area with a pillow or your hand with coughing     If you develop severe pain, vomiting and or the area becomes hard or red you need to be seen asap, come to the ER if after hours     Ok to shower and wash as normal     Avoid lifting or straining     Try the rudy tea (tummy tamer) for nausea

## 2020-12-03 NOTE — PROGRESS NOTES
1. Have you been to the ER, urgent care clinic since your last visit? Hospitalized since your last visit? no    2. Have you seen or consulted any other health care providers outside of the [address] [place] [address] since your last visit? Include any pap smears or colon screening.  no

## 2020-12-04 NOTE — PROGRESS NOTES
Chief Complaint   Patient presents with    Surgical Follow-up     2 weeks s/p lap right colectomy. Johanna Rosales is 2 weeks status post right colectomy with ileocolostomy. She had an unresectable right colon polyp. She is here today for surgical follow-up. She reports her hospital stay was horrible and she hopes to never ever have to come back again. She left AMA. She says she was not being well taken care of by the staff and after 2 days feels like she was completely ignored. She has filed a complaint with patient advocacy. She says that the ancillary services including our office are fantastic \"it is just that fifth floor\". Reports she has been doing okay no fevers or chills  She has had some weight loss and says she does not have much of an appetite but she has been cancer 6 times and this is nothing. She has had some intermittent nausea and does get full quickly but denies vomiting.   Reports she is having more normal stools and actually has a little bit of formed stool  Denies black or bloody stools and no melena  She has noticed a bulge in the left abdomen that is sometimes very uncomfortable and \"really sticks out\"  She reports this bulge is new since surgery  She says postop she had a lot of coughing as well as vomiting initially the first couple of days after surgery  She reports she is at her baseline shortness of breath and she continues to use supplemental oxygen  She has a mild loose cough and she reports this is nothing new  She is voiding without difficulty and no dysuria  No recent falls  No dizziness  Visit Vitals  BP (!) 88/57   Pulse 74   Temp 97.7 °F (36.5 °C) (Oral)   Resp 16   Ht 5' 5\" (1.651 m)   Wt 149 lb 9.6 oz (67.9 kg)   SpO2 95%   BMI 24.89 kg/m²     Appears to be in no distress  She is alert and oriented  Chest-loose cough lungs are essentially clear breathing is slightly labored with activity and her oxygen is currently in the waiting room  COR-heart is regular rate and rhythm  ABD-abdomen is soft she has midline staples as well as staples on the left trocar site and just below the umbilicus, bulge noted just proximal to the left trocar site, she is a small hernia that is manually reducible and a little tender; she has no erythema and no induration, bowel sounds are active and otherwise no tenderness  Ext-she is ambulating independently and no swelling in the lower extremities      ICD-10-CM ICD-9-CM    1. Follow-up examination after abdominal surgery  Z09 V67.09    2. S/P right colectomy  Z90.49 V45.89    3. Hernia of abdominal wall  K43.9 553.20      Ms. David Bernal is 2 weeks status post right colectomy with ileocolostomy now with a trocar site hernia that is reducible without obstruction and without gangrene  Advised that she needs to follow-up with Dr. Yolis Reyes to discuss surgical repair of the hernia and she will see him in the office next week  In the interim she can splint the area with coughing sneezing and she is to avoid lifting pushing or pulling anything more than about 10 pounds  Reviewed signs and symptoms of hernia incarceration and that that was a surgical emergency and she need would need to be evaluated promptly  She refuses to go to the emergency room I have advised her to call the office should she be having any difficulty in the next week  Staples were removed and a few Steri-Strips applied to the midline  She may shower as normal  Reviewed diet and may be better with several small meals throughout the day  Suggested nutritional supplement but she has no interest in any of those  Pathology was reviewed with patient and polyp was a tubular adenoma with negative and clean margins as well as 19 negative lymph nodes  Pulmonary toilet with supplemental oxygen as needed (this is her baseline)  I assured Ms. David Bernal that we could request that she go to a different unit in the hospital after having this hernia fixed and hopefully would be a very short stay  His appointment with Dr. William Guerrero next Thursday and will reach out in the interim if needed

## 2020-12-10 ENCOUNTER — OFFICE VISIT (OUTPATIENT)
Dept: SURGERY | Age: 70
End: 2020-12-10
Payer: MEDICARE

## 2020-12-10 VITALS
HEART RATE: 83 BPM | DIASTOLIC BLOOD PRESSURE: 49 MMHG | TEMPERATURE: 100 F | OXYGEN SATURATION: 93 % | BODY MASS INDEX: 24.32 KG/M2 | WEIGHT: 146 LBS | RESPIRATION RATE: 20 BRPM | SYSTOLIC BLOOD PRESSURE: 94 MMHG | HEIGHT: 65 IN

## 2020-12-10 DIAGNOSIS — K43.2 INCISIONAL HERNIA, WITHOUT OBSTRUCTION OR GANGRENE: Primary | ICD-10-CM

## 2020-12-10 PROCEDURE — G8400 PT W/DXA NO RESULTS DOC: HCPCS | Performed by: SURGERY

## 2020-12-10 PROCEDURE — G9708 BILAT MAST/HX BI /UNILAT MAS: HCPCS | Performed by: SURGERY

## 2020-12-10 PROCEDURE — 1090F PRES/ABSN URINE INCON ASSESS: CPT | Performed by: SURGERY

## 2020-12-10 PROCEDURE — 3288F FALL RISK ASSESSMENT DOCD: CPT | Performed by: SURGERY

## 2020-12-10 PROCEDURE — G8420 CALC BMI NORM PARAMETERS: HCPCS | Performed by: SURGERY

## 2020-12-10 PROCEDURE — 1111F DSCHRG MED/CURRENT MED MERGE: CPT | Performed by: SURGERY

## 2020-12-10 PROCEDURE — 99213 OFFICE O/P EST LOW 20 MIN: CPT | Performed by: SURGERY

## 2020-12-10 PROCEDURE — G8427 DOCREV CUR MEDS BY ELIG CLIN: HCPCS | Performed by: SURGERY

## 2020-12-10 PROCEDURE — G8536 NO DOC ELDER MAL SCRN: HCPCS | Performed by: SURGERY

## 2020-12-10 PROCEDURE — G8510 SCR DEP NEG, NO PLAN REQD: HCPCS | Performed by: SURGERY

## 2020-12-10 PROCEDURE — 1100F PTFALLS ASSESS-DOCD GE2>/YR: CPT | Performed by: SURGERY

## 2020-12-10 PROCEDURE — G9711 PT HX TOT COL OR COLON CA: HCPCS | Performed by: SURGERY

## 2020-12-10 NOTE — PROGRESS NOTES
1. Have you been to the ER, urgent care clinic since your last visit? Hospitalized since your last visit? No    2. Have you seen or consulted any other health care providers outside of the [address] [place] [address] since your last visit? Include any pap smears or colon screening.  No

## 2020-12-11 ENCOUNTER — TRANSCRIBE ORDER (OUTPATIENT)
Dept: REGISTRATION | Age: 70
End: 2020-12-11

## 2020-12-11 ENCOUNTER — HOSPITAL ENCOUNTER (OUTPATIENT)
Dept: PREADMISSION TESTING | Age: 70
Discharge: HOME | End: 2020-12-11
Payer: MEDICARE

## 2020-12-11 DIAGNOSIS — Z01.812 PRE-PROCEDURE LAB EXAM: Primary | ICD-10-CM

## 2020-12-11 DIAGNOSIS — Z01.812 PRE-PROCEDURE LAB EXAM: ICD-10-CM

## 2020-12-11 PROBLEM — K43.2 INCISIONAL HERNIA, WITHOUT OBSTRUCTION OR GANGRENE: Status: ACTIVE | Noted: 2020-12-11

## 2020-12-11 PROCEDURE — 87635 SARS-COV-2 COVID-19 AMP PRB: CPT

## 2020-12-11 NOTE — PATIENT INSTRUCTIONS
Hernia Repair: Before Your Surgery What is hernia repair surgery? A hernia occurs when a weak spot in your belly muscles allows a piece of your intestines or the tissue around them to poke through. This can cause a bulge in the area. It can also cause pain. But you may not feel anything. The hernia may be in your groin. Or it may be near your belly button. In some cases, it's in a scar from an earlier surgery. A doctor can fix a hernia through a cut (incision) made near it. This is called open surgery. Or the doctor may make some very small cuts and use a thin, lighted scope and small tools. This is laparoscopic surgery. If your hernia is bulging, the bulge is pushed back into place. The doctor then sews the healthy tissue back together. Often a piece of material is used to patch the weak spot. Open surgery will leave a longer scar. Laparoscopic surgery leaves a few small scars. The scars will fade with time. You may need to take 1 to 2 weeks off from work. This depends on the type of work you do and how you feel. Follow-up care is a key part of your treatment and safety. Be sure to make and go to all appointments, and call your doctor if you are having problems. It's also a good idea to know your test results and keep a list of the medicines you take. How do you prepare for surgery? Surgery can be stressful. This information will help you understand what you can expect. And it will help you safely prepare for surgery. Preparing for surgery 
  · Be sure you have someone to take you home. Anesthesia and pain medicine will make it unsafe for you to drive or get home on your own.  
  · Understand exactly what surgery is planned, along with the risks, benefits, and other options.  
  · If you take aspirin or some other blood thinner, ask your doctor if you should stop taking it before your surgery. Make sure that you understand exactly what your doctor wants you to do.  These medicines increase the risk of bleeding.  
  · Tell your doctor ALL the medicines, vitamins, supplements, and herbal remedies you take. Some may increase the risk of problems during your surgery. Your doctor will tell you if you should stop taking any of them before the surgery and how soon to do it.  
  · Make sure your doctor and the hospital have a copy of your advance directive. If you don't have one, you may want to prepare one. It lets others know your health care wishes. It's a good thing to have before any type of surgery or procedure. What happens on the day of surgery? · Follow the instructions exactly about when to stop eating and drinking. If you don't, your surgery may be canceled. If your doctor told you to take your medicines on the day of surgery, take them with only a sip of water.  
  · Take a bath or shower before you come in for your surgery. Do not apply lotions, perfumes, deodorants, or nail polish.  
  · Do not shave the surgical site yourself.  
  · Take off all jewelry and piercings. And take out contact lenses, if you wear them. At the hospital or surgery center · Bring a picture ID.  
  · The area for surgery is often marked to make sure there are no errors.  
  · You will be kept comfortable and safe by your anesthesia provider. You will be asleep during the surgery.  
  · The surgery will take about 30 minutes to 2 hours. This depends on the size of the hernia and where it is. When should you call your doctor? · You have questions or concerns.  
  · You don't understand how to prepare for your surgery.  
  · You become ill before the surgery (such as fever, flu, or a cold).  
  · You need to reschedule or have changed your mind about having the surgery. Where can you learn more? Go to http://www.gray.com/ Enter J839 in the search box to learn more about \"Hernia Repair: Before Your Surgery. \" 
 Current as of: April 15, 2020               Content Version: 12.6 © 8401-2300 [employer]. Care instructions adapted under license by [employer] (which disclaims liability or warranty for this information). If you have questions about a medical condition or this instruction, always ask your healthcare professional. [address] any warranty or liability for your use of this information.

## 2020-12-13 LAB — SARS-COV-2, COV2NT: NOT DETECTED

## 2020-12-14 ENCOUNTER — ANESTHESIA (OUTPATIENT)
Dept: SURGERY | Age: 70
End: 2020-12-14
Payer: MEDICARE

## 2020-12-14 ENCOUNTER — ANESTHESIA EVENT (OUTPATIENT)
Dept: SURGERY | Age: 70
End: 2020-12-14
Payer: MEDICARE

## 2020-12-14 ENCOUNTER — HOSPITAL ENCOUNTER (OUTPATIENT)
Age: 70
Setting detail: OUTPATIENT SURGERY
Discharge: HOME | End: 2020-12-14
Attending: SURGERY | Admitting: SURGERY
Payer: MEDICARE

## 2020-12-14 VITALS
BODY MASS INDEX: 24.32 KG/M2 | TEMPERATURE: 97.9 F | DIASTOLIC BLOOD PRESSURE: 40 MMHG | HEIGHT: 65 IN | HEART RATE: 57 BPM | RESPIRATION RATE: 21 BRPM | OXYGEN SATURATION: 94 % | SYSTOLIC BLOOD PRESSURE: 98 MMHG | WEIGHT: 145.94 LBS

## 2020-12-14 DIAGNOSIS — K43.2 INCISIONAL HERNIA, WITHOUT OBSTRUCTION OR GANGRENE: ICD-10-CM

## 2020-12-14 DIAGNOSIS — Z98.890 S/P LAPAROSCOPY: Primary | ICD-10-CM

## 2020-12-14 PROCEDURE — 76010000138 HC OR TIME 0.5 TO 1 HR: Performed by: SURGERY

## 2020-12-14 PROCEDURE — 74011250636 HC RX REV CODE- 250/636: Performed by: SURGERY

## 2020-12-14 PROCEDURE — 77030002924 HC SUT GORTX WLGO -B: Performed by: SURGERY

## 2020-12-14 PROCEDURE — 74011250636 HC RX REV CODE- 250/636: Performed by: NURSE ANESTHETIST, CERTIFIED REGISTERED

## 2020-12-14 PROCEDURE — 74011000250 HC RX REV CODE- 250: Performed by: NURSE ANESTHETIST, CERTIFIED REGISTERED

## 2020-12-14 PROCEDURE — 76060000033 HC ANESTHESIA 1 TO 1.5 HR: Performed by: SURGERY

## 2020-12-14 PROCEDURE — 76210000017 HC OR PH I REC 1.5 TO 2 HR: Performed by: SURGERY

## 2020-12-14 PROCEDURE — 77030002933 HC SUT MCRYL J&J -A: Performed by: SURGERY

## 2020-12-14 PROCEDURE — 77030008608 HC TRCR ENDOSC SMTH AMR -B: Performed by: SURGERY

## 2020-12-14 PROCEDURE — 77030020829: Performed by: SURGERY

## 2020-12-14 PROCEDURE — 77030040361 HC SLV COMPR DVT MDII -B: Performed by: SURGERY

## 2020-12-14 PROCEDURE — 77030013079 HC BLNKT BAIR HGGR 3M -A: Performed by: ANESTHESIOLOGY

## 2020-12-14 PROCEDURE — 77030008684 HC TU ET CUF COVD -B: Performed by: ANESTHESIOLOGY

## 2020-12-14 PROCEDURE — 77030012770 HC TRCR OPT FX AMR -B: Performed by: SURGERY

## 2020-12-14 PROCEDURE — 77030010507 HC ADH SKN DERMBND J&J -B: Performed by: SURGERY

## 2020-12-14 PROCEDURE — 74011250636 HC RX REV CODE- 250/636: Performed by: ANESTHESIOLOGY

## 2020-12-14 PROCEDURE — 74011000250 HC RX REV CODE- 250: Performed by: SURGERY

## 2020-12-14 PROCEDURE — 74011250637 HC RX REV CODE- 250/637: Performed by: ANESTHESIOLOGY

## 2020-12-14 PROCEDURE — 49320 DIAG LAPARO SEPARATE PROC: CPT | Performed by: SURGERY

## 2020-12-14 PROCEDURE — 2709999900 HC NON-CHARGEABLE SUPPLY: Performed by: SURGERY

## 2020-12-14 PROCEDURE — 77030002895 HC DEV VASC CLOSR COVD -B: Performed by: SURGERY

## 2020-12-14 PROCEDURE — 77030026438 HC STYL ET INTUB CARD -A: Performed by: ANESTHESIOLOGY

## 2020-12-14 PROCEDURE — 49320 DIAG LAPARO SEPARATE PROC: CPT | Performed by: PHYSICIAN ASSISTANT

## 2020-12-14 PROCEDURE — 76210000020 HC REC RM PH II FIRST 0.5 HR: Performed by: SURGERY

## 2020-12-14 PROCEDURE — 77030031139 HC SUT VCRL2 J&J -A: Performed by: SURGERY

## 2020-12-14 PROCEDURE — 77030016151 HC PROTCTR LNS DFOG COVD -B: Performed by: SURGERY

## 2020-12-14 RX ORDER — SODIUM CHLORIDE, SODIUM LACTATE, POTASSIUM CHLORIDE, CALCIUM CHLORIDE 600; 310; 30; 20 MG/100ML; MG/100ML; MG/100ML; MG/100ML
1000 INJECTION, SOLUTION INTRAVENOUS CONTINUOUS
Status: DISCONTINUED | OUTPATIENT
Start: 2020-12-14 | End: 2020-12-14 | Stop reason: HOSPADM

## 2020-12-14 RX ORDER — LIDOCAINE HYDROCHLORIDE 10 MG/ML
0.1 INJECTION, SOLUTION EPIDURAL; INFILTRATION; INTRACAUDAL; PERINEURAL AS NEEDED
Status: DISCONTINUED | OUTPATIENT
Start: 2020-12-14 | End: 2020-12-14 | Stop reason: HOSPADM

## 2020-12-14 RX ORDER — ACETAMINOPHEN 325 MG/1
650 TABLET ORAL ONCE
Status: COMPLETED | OUTPATIENT
Start: 2020-12-14 | End: 2020-12-14

## 2020-12-14 RX ORDER — SODIUM CHLORIDE 9 MG/ML
25 INJECTION, SOLUTION INTRAVENOUS CONTINUOUS
Status: DISCONTINUED | OUTPATIENT
Start: 2020-12-14 | End: 2020-12-14 | Stop reason: HOSPADM

## 2020-12-14 RX ORDER — PHENYLEPHRINE HCL IN 0.9% NACL 0.4MG/10ML
SYRINGE (ML) INTRAVENOUS AS NEEDED
Status: DISCONTINUED | OUTPATIENT
Start: 2020-12-14 | End: 2020-12-14 | Stop reason: HOSPADM

## 2020-12-14 RX ORDER — MIDAZOLAM HYDROCHLORIDE 1 MG/ML
1 INJECTION, SOLUTION INTRAMUSCULAR; INTRAVENOUS AS NEEDED
Status: DISCONTINUED | OUTPATIENT
Start: 2020-12-14 | End: 2020-12-14 | Stop reason: HOSPADM

## 2020-12-14 RX ORDER — FENTANYL CITRATE 50 UG/ML
25 INJECTION, SOLUTION INTRAMUSCULAR; INTRAVENOUS
Status: DISCONTINUED | OUTPATIENT
Start: 2020-12-14 | End: 2020-12-14 | Stop reason: HOSPADM

## 2020-12-14 RX ORDER — OXYCODONE HYDROCHLORIDE 5 MG/1
5 TABLET ORAL AS NEEDED
Status: DISCONTINUED | OUTPATIENT
Start: 2020-12-14 | End: 2020-12-14 | Stop reason: HOSPADM

## 2020-12-14 RX ORDER — ONDANSETRON 4 MG/1
4 TABLET, ORALLY DISINTEGRATING ORAL
Qty: 10 TAB | Refills: 0 | Status: SHIPPED | OUTPATIENT
Start: 2020-12-14

## 2020-12-14 RX ORDER — MIDAZOLAM HYDROCHLORIDE 1 MG/ML
INJECTION, SOLUTION INTRAMUSCULAR; INTRAVENOUS AS NEEDED
Status: DISCONTINUED | OUTPATIENT
Start: 2020-12-14 | End: 2020-12-14 | Stop reason: HOSPADM

## 2020-12-14 RX ORDER — ROCURONIUM BROMIDE 10 MG/ML
INJECTION, SOLUTION INTRAVENOUS AS NEEDED
Status: DISCONTINUED | OUTPATIENT
Start: 2020-12-14 | End: 2020-12-14 | Stop reason: HOSPADM

## 2020-12-14 RX ORDER — ONDANSETRON 2 MG/ML
4 INJECTION INTRAMUSCULAR; INTRAVENOUS AS NEEDED
Status: DISCONTINUED | OUTPATIENT
Start: 2020-12-14 | End: 2020-12-14 | Stop reason: HOSPADM

## 2020-12-14 RX ORDER — SUCCINYLCHOLINE CHLORIDE 20 MG/ML
INJECTION INTRAMUSCULAR; INTRAVENOUS AS NEEDED
Status: DISCONTINUED | OUTPATIENT
Start: 2020-12-14 | End: 2020-12-14 | Stop reason: HOSPADM

## 2020-12-14 RX ORDER — EPHEDRINE SULFATE/0.9% NACL/PF 50 MG/5 ML
5 SYRINGE (ML) INTRAVENOUS AS NEEDED
Status: DISCONTINUED | OUTPATIENT
Start: 2020-12-14 | End: 2020-12-14 | Stop reason: HOSPADM

## 2020-12-14 RX ORDER — ROPIVACAINE HYDROCHLORIDE 5 MG/ML
150 INJECTION, SOLUTION EPIDURAL; INFILTRATION; PERINEURAL AS NEEDED
Status: DISCONTINUED | OUTPATIENT
Start: 2020-12-14 | End: 2020-12-14 | Stop reason: HOSPADM

## 2020-12-14 RX ORDER — DIPHENHYDRAMINE HYDROCHLORIDE 50 MG/ML
12.5 INJECTION, SOLUTION INTRAMUSCULAR; INTRAVENOUS AS NEEDED
Status: DISCONTINUED | OUTPATIENT
Start: 2020-12-14 | End: 2020-12-14 | Stop reason: HOSPADM

## 2020-12-14 RX ORDER — SODIUM CHLORIDE, SODIUM LACTATE, POTASSIUM CHLORIDE, CALCIUM CHLORIDE 600; 310; 30; 20 MG/100ML; MG/100ML; MG/100ML; MG/100ML
100 INJECTION, SOLUTION INTRAVENOUS CONTINUOUS
Status: DISCONTINUED | OUTPATIENT
Start: 2020-12-14 | End: 2020-12-14 | Stop reason: HOSPADM

## 2020-12-14 RX ORDER — FENTANYL CITRATE 50 UG/ML
50 INJECTION, SOLUTION INTRAMUSCULAR; INTRAVENOUS AS NEEDED
Status: DISCONTINUED | OUTPATIENT
Start: 2020-12-14 | End: 2020-12-14 | Stop reason: HOSPADM

## 2020-12-14 RX ORDER — BUPIVACAINE HYDROCHLORIDE 5 MG/ML
50 INJECTION, SOLUTION EPIDURAL; INTRACAUDAL ONCE
Status: COMPLETED | OUTPATIENT
Start: 2020-12-14 | End: 2020-12-14

## 2020-12-14 RX ORDER — LIDOCAINE HYDROCHLORIDE 20 MG/ML
INJECTION, SOLUTION EPIDURAL; INFILTRATION; INTRACAUDAL; PERINEURAL AS NEEDED
Status: DISCONTINUED | OUTPATIENT
Start: 2020-12-14 | End: 2020-12-14 | Stop reason: HOSPADM

## 2020-12-14 RX ORDER — MORPHINE SULFATE 10 MG/ML
2 INJECTION, SOLUTION INTRAMUSCULAR; INTRAVENOUS
Status: DISCONTINUED | OUTPATIENT
Start: 2020-12-14 | End: 2020-12-14 | Stop reason: HOSPADM

## 2020-12-14 RX ORDER — FENTANYL CITRATE 50 UG/ML
INJECTION, SOLUTION INTRAMUSCULAR; INTRAVENOUS AS NEEDED
Status: DISCONTINUED | OUTPATIENT
Start: 2020-12-14 | End: 2020-12-14 | Stop reason: HOSPADM

## 2020-12-14 RX ORDER — HYDROCODONE BITARTRATE AND ACETAMINOPHEN 5; 325 MG/1; MG/1
.5-1 TABLET ORAL
Qty: 15 TAB | Refills: 0 | Status: SHIPPED | OUTPATIENT
Start: 2020-12-14 | End: 2020-12-17

## 2020-12-14 RX ORDER — DEXAMETHASONE SODIUM PHOSPHATE 4 MG/ML
INJECTION, SOLUTION INTRA-ARTICULAR; INTRALESIONAL; INTRAMUSCULAR; INTRAVENOUS; SOFT TISSUE AS NEEDED
Status: DISCONTINUED | OUTPATIENT
Start: 2020-12-14 | End: 2020-12-14 | Stop reason: HOSPADM

## 2020-12-14 RX ORDER — HYDROMORPHONE HYDROCHLORIDE 1 MG/ML
0.2 INJECTION, SOLUTION INTRAMUSCULAR; INTRAVENOUS; SUBCUTANEOUS
Status: ACTIVE | OUTPATIENT
Start: 2020-12-14 | End: 2020-12-14

## 2020-12-14 RX ORDER — ONDANSETRON 2 MG/ML
INJECTION INTRAMUSCULAR; INTRAVENOUS AS NEEDED
Status: DISCONTINUED | OUTPATIENT
Start: 2020-12-14 | End: 2020-12-14 | Stop reason: HOSPADM

## 2020-12-14 RX ORDER — PROPOFOL 10 MG/ML
INJECTION, EMULSION INTRAVENOUS AS NEEDED
Status: DISCONTINUED | OUTPATIENT
Start: 2020-12-14 | End: 2020-12-14 | Stop reason: HOSPADM

## 2020-12-14 RX ORDER — NEOSTIGMINE METHYLSULFATE 1 MG/ML
INJECTION, SOLUTION INTRAVENOUS AS NEEDED
Status: DISCONTINUED | OUTPATIENT
Start: 2020-12-14 | End: 2020-12-14 | Stop reason: HOSPADM

## 2020-12-14 RX ORDER — MIDAZOLAM HYDROCHLORIDE 1 MG/ML
0.5 INJECTION, SOLUTION INTRAMUSCULAR; INTRAVENOUS
Status: DISCONTINUED | OUTPATIENT
Start: 2020-12-14 | End: 2020-12-14 | Stop reason: HOSPADM

## 2020-12-14 RX ORDER — SCOLOPAMINE TRANSDERMAL SYSTEM 1 MG/1
1 PATCH, EXTENDED RELEASE TRANSDERMAL ONCE
Status: DISCONTINUED | OUTPATIENT
Start: 2020-12-14 | End: 2020-12-14 | Stop reason: HOSPADM

## 2020-12-14 RX ORDER — GLYCOPYRROLATE 0.2 MG/ML
INJECTION INTRAMUSCULAR; INTRAVENOUS AS NEEDED
Status: DISCONTINUED | OUTPATIENT
Start: 2020-12-14 | End: 2020-12-14 | Stop reason: HOSPADM

## 2020-12-14 RX ADMIN — PROPOFOL 130 MG: 10 INJECTION, EMULSION INTRAVENOUS at 15:32

## 2020-12-14 RX ADMIN — SODIUM CHLORIDE, POTASSIUM CHLORIDE, SODIUM LACTATE AND CALCIUM CHLORIDE 1000 ML: 600; 310; 30; 20 INJECTION, SOLUTION INTRAVENOUS at 14:07

## 2020-12-14 RX ADMIN — Medication 3 MG: at 16:11

## 2020-12-14 RX ADMIN — ACETAMINOPHEN 650 MG: 325 TABLET ORAL at 14:10

## 2020-12-14 RX ADMIN — GLYCOPYRROLATE 0.4 MG: 0.2 INJECTION, SOLUTION INTRAMUSCULAR; INTRAVENOUS at 16:11

## 2020-12-14 RX ADMIN — ONDANSETRON HYDROCHLORIDE 4 MG: 2 INJECTION, SOLUTION INTRAMUSCULAR; INTRAVENOUS at 16:09

## 2020-12-14 RX ADMIN — SUCCINYLCHOLINE CHLORIDE 120 MG: 20 INJECTION, SOLUTION INTRAMUSCULAR; INTRAVENOUS at 15:32

## 2020-12-14 RX ADMIN — ROCURONIUM BROMIDE 25 MG: 10 SOLUTION INTRAVENOUS at 15:38

## 2020-12-14 RX ADMIN — ROCURONIUM BROMIDE 5 MG: 10 SOLUTION INTRAVENOUS at 15:32

## 2020-12-14 RX ADMIN — Medication 80 MCG: at 15:46

## 2020-12-14 RX ADMIN — WATER 2 G: 1 INJECTION INTRAMUSCULAR; INTRAVENOUS; SUBCUTANEOUS at 15:38

## 2020-12-14 RX ADMIN — FENTANYL CITRATE 25 MCG: 50 INJECTION, SOLUTION INTRAMUSCULAR; INTRAVENOUS at 16:59

## 2020-12-14 RX ADMIN — FENTANYL CITRATE 50 MCG: 50 INJECTION, SOLUTION INTRAMUSCULAR; INTRAVENOUS at 15:32

## 2020-12-14 RX ADMIN — DEXAMETHASONE SODIUM PHOSPHATE 8 MG: 4 INJECTION, SOLUTION INTRAMUSCULAR; INTRAVENOUS at 15:38

## 2020-12-14 RX ADMIN — MIDAZOLAM 2 MG: 1 INJECTION INTRAMUSCULAR; INTRAVENOUS at 15:24

## 2020-12-14 RX ADMIN — LIDOCAINE HYDROCHLORIDE 60 MG: 20 INJECTION, SOLUTION EPIDURAL; INFILTRATION; INTRACAUDAL; PERINEURAL at 15:32

## 2020-12-14 RX ADMIN — Medication 80 MCG: at 15:52

## 2020-12-14 RX ADMIN — PROPOFOL 70 MG: 10 INJECTION, EMULSION INTRAVENOUS at 16:04

## 2020-12-14 NOTE — BRIEF OP NOTE
Brief Postoperative Note    Patient: Shaheen Hayes  YOB: 1950  MRN: [id number]    Date of Procedure: 12/14/2020     Pre-Op Diagnosis: INCISIONAL HERNIA    Post-Op Diagnosis: ABD WALL SEROMA      Procedure(s):  DIAGNOSTIC LAPAROSCOPY; SEROMA DRAINAGE     Surgeon(s):  Jaida Espinoza MD    Surgical Assistant: Physician Assistant: DANETTE Hardy    Anesthesia: General     Estimated Blood Loss (mL): Minimal    Complications: None    Specimens: * No specimens in log *     Implants: * No implants in log *    Drains: * No LDAs found *    Findings: 1 mm opening at superior end of LLQ trocar closure with large seroma (evacuated); no fascial defect.     Electronically Signed by Israel Simon MD on 12/14/2020 at 4:30 PM

## 2020-12-14 NOTE — PERIOP NOTES
Spoke with patient's family member, [address] [place] [address]. Given update of surgical progress.

## 2020-12-14 NOTE — ANESTHESIA PREPROCEDURE EVALUATION
Relevant Problems   No relevant active problems       Anesthetic History     PONV          Review of Systems / Medical History  Patient summary reviewed, nursing notes reviewed and pertinent labs reviewed    Pulmonary    COPD        Asthma     Comments: O2 @ 2L    Neuro/Psych   Within defined limits           Cardiovascular    Hypertension              Exercise tolerance: <4 METS     GI/Hepatic/Renal     GERD           Endo/Other    Diabetes    Arthritis and cancer     Other Findings   Comments: SLE         Physical Exam    Airway  Mallampati: II  TM Distance: > 6 cm  Neck ROM: normal range of motion   Mouth opening: Normal     Cardiovascular  Regular rate and rhythm,  S1 and S2 normal,  no murmur, click, rub, or gallop             Dental    Dentition: Full upper dentures and Lower partial plate     Pulmonary  Breath sounds clear to auscultation               Abdominal  GI exam deferred       Other Findings            Anesthetic Plan    ASA: 3  Anesthesia type: general          Induction: Intravenous  Anesthetic plan and risks discussed with: Patient

## 2020-12-14 NOTE — DISCHARGE INSTRUCTIONS
Laparoscopy: What to Expect at [address] [place] [address]  After laparoscopic surgery, you are likely to have pain for the next several days. You may have a low fever and feel tired and sick to your stomach. This is common. You should feel better after 1 to 2 weeks. This care sheet gives you a general idea about how long it will take for you to recover. But each person recovers at a different pace. Follow the steps below to get better as quickly as possible. How can you care for yourself at home? Activity    · Rest when you feel tired. Getting enough sleep will help you recover.     · Try to walk each day. Start by walking a little more than you did the day before. Bit by bit, increase the amount you walk. Walking boosts blood flow and helps prevent pneumonia and constipation.     · Avoid strenuous activities, such as bicycle riding, jogging, weight lifting, or aerobic exercise, until your doctor says it is okay.     · Avoid lifting anything that would make you strain. This may include a child, heavy grocery bags and milk containers, a heavy briefcase or backpack, cat litter or dog food bags, or a vacuum .     · You may also have pain in your shoulder. The pain usually lasts about 1 or 2 days.     · You may drive when you are no longer taking pain medicine and can quickly move your foot from the gas pedal to the brake. You must also be able to sit comfortably for a long period of time, even if you do not plan to go far. You might get caught in traffic.     · You will probably need to take 2 weeks off from work. It depends on the type of work you do and how you feel.     · You may shower 24 to 48 hours after surgery, if your doctor okays it. Pat the cut (incision) dry. Do not take a bath for the first 2 weeks, or until your doctor tells you it is okay.    Diet    · If your stomach is upset, try bland, low-fat foods such as plain rice, broiled chicken, toast, and yogurt.     · Drink plenty of fluids (enough so that your urine is light yellow or clear like water) to prevent dehydration. Choose water and other caffeine-free clear liquids. If you have kidney, heart, or liver disease and have to limit fluids, talk with your doctor before you increase the amount of fluids you drink.     · You may notice that your bowel movements are not regular right after your surgery. This is common. Avoid constipation and straining with bowel movements. You may want to take a fiber supplement every day. If you have not had a bowel movement after a couple of days, ask your doctor about taking a mild laxative. Medicines    · Your doctor will tell you if and when you can restart your medicines. He or she will also give you instructions about taking any new medicines.     · If you take aspirin or some other blood thinner, ask your doctor if and when to start taking it again. Make sure that you understand exactly what your doctor wants you to do.     · Take pain medicines exactly as directed. ? If the doctor gave you a prescription medicine for pain, take it as prescribed. ? If you are not taking a prescription pain medicine, ask your doctor if you can take an over-the-counter medicine.     · If your doctor prescribed antibiotics, take them as directed. Do not stop taking them just because you feel better. You need to take the full course of antibiotics.     · If you think your pain medicine is making you sick to your stomach:  ? Take your medicine after meals (unless your doctor has told you not to). ? Ask your doctor for a different pain medicine. Incision care    · If you have strips of tape on the incision, leave the tape on for a week or until it falls off.     · Wash the area daily with warm, soapy water and pat it dry. Don't use hydrogen peroxide or alcohol, which can slow healing. You may cover the area with a gauze bandage if it weeps or rubs against clothing. Change the bandage every day.    Follow-up care is a key part of your treatment and safety. Be sure to make and go to all appointments, and call your doctor if you are having problems. It's also a good idea to know your test results and keep a list of the medicines you take. When should you call for help? Call 911 anytime you think you may need emergency care. For example, call if:    · You passed out (lost consciousness).     · You are short of breath. Call your doctor now or seek immediate medical care if:    · You have pain that does not get better after you take pain medicine.     · You have loose stitches, or your incision comes open.     · Bright red blood has soaked through your bandage.     · You have signs of infection, such as:  ? Increased pain, swelling, warmth, or redness. ? Red streaks leading from the incision. ? Pus draining from the incision. ? A fever.     · You are sick to your stomach or cannot keep fluids down.     · You have signs of a blood clot in your leg (called a deep vein thrombosis), such as:  ? Pain in your calf, back of the knee, thigh, or groin. ? Redness and swelling in your leg or groin.     · You cannot pass stools or gas. Watch closely for any changes in your health, and be sure to contact your doctor if you have any problems. Where can you learn more? Go to http://www.gray.com/  Enter G657 in the search box to learn more about \"Laparoscopy: What to Expect at Home. \"  Current as of: April 15, 2020               Content Version: 12.6  © 7097-7741 [employer]. Care instructions adapted under license by [employer] (which disclaims liability or warranty for this information). If you have questions about a medical condition or this instruction, always ask your healthcare professional. [place] [address] any warranty or liability for your use of this information.          ______________________________________________________________________    Anesthesia Discharge Instructions    After general anesthesia or intervenous sedation, for 24 hours or while taking prescription Narcotics:  · Limit your activities  · Do not drive or operate hazardous machinery  · If you have not urinated within 8 hours after discharge, please contact your surgeon on call. · Do not make important personal or business decisions  · Do not drink alcoholic beverages    Report the following to your surgeon:  · Excessive pain, swelling, redness or odor of or around the surgical area  · Temperature over 100.5 degrees  · Nausea and vomiting lasting longer than 4 hours or if unable to take medication  · Any signs of decreased circulation or nerve impairment to extremity:  Change in color, persistent numbness, tingling, coldness or increased pain.   · Any questions

## 2020-12-14 NOTE — ROUTINE PROCESS
Patient: Sheree Montilla MRN: [id number]  SSN: [id number] YOB: 1950  Age: 72 y.o. Sex: female Patient is status post Procedure(s): DIAGNOSTIC LAPAROSCOPY; SEROMA DRAINAGE . Surgeon(s) and Role: Jony Laurent MD - Primary Local/Dose/Irrigation:  SEE MAR Peripheral IV 12/14/20 Left Wrist (Active) Dressing/Packing:  Wound Abdomen 2 TROCAR SITES-Dressing/Treatment: Tegaderm/Transparent film dressing;Gauze dressing/dressing sponge;Skin glue (12/14/20 1500) Splint/Cast:  ] Other:

## 2020-12-14 NOTE — ANESTHESIA POSTPROCEDURE EVALUATION
Post-Anesthesia Evaluation and Assessment    Patient: Ptati Metcalf MRN: [id number]  SSN: [id number]    YOB: 1950  Age: 72 y.o. Sex: female       Cardiovascular Function/Vital Signs  Visit Vitals  BP (!) 107/41   Pulse 60   Temp 36.6 °C (97.9 °F)   Resp 19   Ht 5' 5\" (1.651 m)   Wt 66.2 kg (145 lb 15.1 oz)   SpO2 99%   BMI 24.29 kg/m²       Patient is status post General anesthesia for Procedure(s):  DIAGNOSTIC LAPAROSCOPY; SEROMA DRAINAGE . Nausea/Vomiting: None    Postoperative hydration reviewed and adequate. Pain:  Pain Scale 1: Numeric (0 - 10) (12/14/20 1723)  Pain Intensity 1: 3 (12/14/20 1723)   Managed    Neurological Status:   Neuro (WDL): Within Defined Limits (12/14/20 1723)   At baseline    Mental Status and Level of Consciousness: Alert and oriented to person, place, and time    Pulmonary Status:   O2 Device: Room air (12/14/20 1723)   Adequate oxygenation and airway patent    Complications related to anesthesia: None    Post-anesthesia assessment completed. No concerns    Signed By: Jonh Philip MD     December 14, 2020              Procedure(s):  DIAGNOSTIC LAPAROSCOPY; SEROMA DRAINAGE . general    <BSHSIANPOST>    INITIAL Post-op Vital signs:   Vitals Value Taken Time   /41 12/14/2020  5:15 PM   Temp 36.6 °C (97.9 °F) 12/14/2020  4:30 PM   Pulse 59 12/14/2020  5:24 PM   Resp 22 12/14/2020  5:24 PM   SpO2 97 % 12/14/2020  5:24 PM   Vitals shown include unvalidated device data.

## 2020-12-14 NOTE — H&P
Subjective:      Asmita Morales is a 72 y.o. female with a history of unresectable cecal polyp, managed through laparoscopic right colectomy about 4 weeks ago. Her postoperative course was complicated by ileus, which prolonged her hospitalization. She left AGAINST MEDICAL ADVICE but was tolerating diet at that time. 1 week following hospital discharge, she noticed a tender bulge in the left lower quadrant, which since its identification has enlarged in size. She can reduce the bulge when she lies down. She notes nausea but denies vomiting, change in bowel habits, fever or chills. She continues to need oxygen at night. She is tolerating a diet.     Patient Active Problem List    Diagnosis Date Noted    Incisional hernia, without obstruction or gangrene 12/11/2020    Mass of hepatic flexure of colon 11/16/2020    Simple chronic bronchitis ([address] 11/04/2020    Malignant neoplasm of upper lobe of left lung ([address] 11/04/2020    Gastroesophageal reflux disease without esophagitis 11/04/2020    Controlled type 2 diabetes mellitus without complication, without long-term current use of insulin ([address] 11/04/2020    Essential hypertension 11/04/2020    Mass of colon, ascending colon 11/04/2020    Post-op pain 02/07/2011    Breast cancer, stage 2 ([address] 01/25/2011    Neuropathy 01/25/2011    Chronic fatigue 01/25/2011    Dyspnea 01/25/2011    Lupus ([address] 01/25/2011     Past Medical History:   Diagnosis Date    Arthritis     OSTEO-ALL OVER    Asthma     Breast cancer, stage 2 ([address] 1/25/2011    RIGHT    Cancer ([address] 2004    BREAST CA left no sticks/bp in left arm    Chronic fatigue 1/25/2011    Chronic obstructive pulmonary disease (HCC)     Chronic pain     JOINT PAIN AND LEFT CHEST PAIN    Chronic pain     possible fibromyalgia    Dyspnea 1/25/2011    Emphysema lung (HCC)     GERD (gastroesophageal reflux disease)     Hematuria     High cholesterol     Lung cancer (HCC)     Lupus ([address] 1/25/2011    Nausea & vomiting     Neuropathy 1/25/2011    PMR (polymyalgia rheumatica) (HCC)     S/P chemotherapy, time since greater than 12 weeks     S/P radiation therapy > 12 wks ago     Unspecified adverse effect of anesthesia 2010    SEVERE HYPERTENSION DURING ORAL SURGERY    Use of letrozole (Femara)     Vitamin D deficiency       Past Surgical History:   Procedure Laterality Date    CHEST SURGERY PROCEDURE UNLISTED Left 2017    LEFT LOBECTOMY    COLONOSCOPY N/A 8/31/2020    COLONOSCOPY   :- performed by Alla Sosa MD at [place] [address]. N/A 10/15/2020    COLONOSCOPY with EMR performed by Addy Ott [facility] at [facility] ENDOSCOPY    HX AMPUTATION Right     RIGHT INDEX FINGER    HX APPENDECTOMY      HX CHOLECYSTECTOMY      HX COLECTOMY Right 11/16/2020    Laparoscopic right colectomy by Dr. Orlando De Los Santos.     HX GI      COLONSOCPY/EGD    HX MASTECTOMY Bilateral     LEFT AND LYMPH NODES    HX MODIFIED RADICAL MASTECTOMY  9-28-04    left, w/ sentinel, Dr. Meghna Florence HX ORTHOPAEDIC Right     RT ANKLE FX/HARDWARE AND REMOVAL HARDWARE    HX RENAL BIOPSY      HX UROLOGICAL      CYSTO/STENTS    [place] LAP,LYMPH NODE BX  9-28-04    left axilla      Social History     Tobacco Use    Smoking status: Current Every Day Smoker     Packs/day: 1.50     Years: 34.00     Pack years: 51.00    Smokeless tobacco: Never Used   Substance Use Topics    Alcohol use: No      Family History   Problem Relation Age of Onset    Alcohol abuse Mother     Cancer Mother         LUNG CA, BREAST CA    Lung Disease Mother     Alcohol abuse Father     Cancer Father         STOMACH CA    No Known Problems Sister     No Known Problems Brother     Anesth Problems Neg Hx       Current Facility-Administered Medications   Medication Dose Route Frequency    lactated Ringers infusion 1,000 mL  1,000 mL IntraVENous CONTINUOUS    0.9% sodium chloride infusion  25 mL/hr IntraVENous CONTINUOUS    lidocaine (PF) (XYLOCAINE) 10 mg/mL (1 %) injection 0.1 mL  0.1 mL SubCUTAneous PRN    fentaNYL citrate (PF) injection 50 mcg  50 mcg IntraVENous PRN    midazolam (VERSED) injection 1 mg  1 mg IntraVENous PRN    midazolam (VERSED) injection 1 mg  1 mg IntraVENous PRN    ropivacaine (PF) (NAROPIN) 5 mg/mL (0.5 %) injection 150 mg  150 mg Peripheral Nerve Block PRN    scopolamine (TRANSDERM-SCOP) 1 mg over 3 days 1 Patch  1 Patch TransDERmal ONCE    ceFAZolin (ANCEF) 2 g in sterile water (preservative free) 20 mL IV syringe  2 g IntraVENous ON CALL TO OR      Allergies   Allergen Reactions    Latex Other (comments)    Codeine Unknown (comments)    Demerol [Meperidine] Unknown (comments)    Januvia [Sitagliptin] Other (comments)     Severe chest pain    Phenergan [Promethazine] Anxiety    Talwin Compound Unknown (comments)       Review of Systems:    A complete review of systems was negative except as noted in the HPI. Objective:        Visit Vitals  /73 (BP 1 Location: Left arm, BP Patient Position: At rest)   Pulse 79   Temp 98.5 °F (36.9 °C)   Resp 16   Ht 5' 5\" (1.651 m)   Wt 66.2 kg (145 lb 15.1 oz)   SpO2 95%   BMI 24.29 kg/m²       Physical Exam:  GENERAL: alert, cooperative, no distress, appears older than stated age, EYE: negative, THROAT & NECK: normal, LUNG: clear to auscultation bilaterally, HEART: regular rate and rhythm, S1, S2 normal, no murmur. ABDOMEN: Nondistended, soft. Healing laparoscopic wounds. Tender, partially reducible left lower quadrant mass at 12 mm trocar site. No cellulitis of overlying skin. EXTREMITIES:  extremities normal, atraumatic, no cyanosis or edema, SKIN: As above. NEUROLOGIC: negative      Assessment:     Left lower quadrant incisional hernia. Plan:     1. I recommend proceeding with laparoscopic incisional hernia repair with mesh.      2. Discussed aspects of surgical intervention, methods, risks (including by not limited to infection, bleeding, hematoma, open procedure, recurrent hernia, and perforation of the intestines or solid organs), and the risks of general anesthetic. The patient understands the risks; all questions were answered to the patient's satisfaction. 3. Patient wishes to proceed with surgery. (3) occasionally moist

## 2020-12-15 NOTE — PROGRESS NOTES
Subjective:      Cameron Timmons is a 72 y.o. female presents for evaluation 3.5 weeks following laparoscopic right colectomy for unresectable polyp. Her postoperative course was complicated by ileus. She was evaluated by a nurse practitioner in our office last week with bulge at left lower quadrant 12 mm site, tender to palpation. No significant change in bowel habits, and she denies nausea or vomiting. Size of bulge changes intermittently during the day. No fever or chills. No wound drainage. Objective:     Visit Vitals  BP (!) 94/49   Pulse 83   Temp 100 °F (37.8 °C)   Resp 20   Ht 5' 5\" (1.651 m)   Wt 66.2 kg (146 lb)   SpO2 93%   BMI 24.30 kg/m²       General:  alert, cooperative, no distress, appears older than stated age   Abdomen:  Nondistended, soft. Laparoscopic wounds healing well. Tender swelling at left lower quadrant 12 mm site. No cellulitis of overlying skin. Lesion is partially reducible. Incision:   Well-healed. Assessment:     Left lower quadrant incisional pain, swelling consistent with incisional hernia. Her postoperative course was complicated by ileus with recurrent emesis, and cough with ongoing tobacco use despite COPD. I recommended laparoscopic repair. Plan:     1. Continue current medications. 2.  Diet as tolerated. 3.  We will schedule for laparoscopic incisional hernia repair with mesh.

## 2020-12-15 NOTE — OP NOTES
[facility]  OPERATIVE REPORT    Name:  Amparo Mace  MR#:  [id number]  :  1950  ACCOUNT #:  [de-identified]  DATE OF SERVICE:  2020      PREOPERATIVE DIAGNOSIS:  Left lower quadrant incisional hernia. POSTOPERATIVE DIAGNOSIS:  Left lower quadrant abdominal wall seroma. PROCEDURES PERFORMED:  1. Diagnostic laparoscopy. 2.  Evacuation of abdominal wall seroma. SURGEON:  Kylah Hatfield MD    ASSISTANT:  DANETTE Wallace    ANESTHESIA:  General endotracheal.    COMPLICATIONS:  None. SPECIMENS REMOVED:  None. IMPLANTS:  None. ESTIMATED BLOOD LOSS:  20 mL. DRAIN:  None. COUNTS:  Sponge count correct. Needle count correct. INDICATIONS:  The patient is a 64-year-old white female with a history of multiple medical problems to include oxygen-dependent COPD, prior lung cancer, prior breast cancer, who underwent laparoscopic right colectomy 4 weeks ago for unresectable cecal polyp. Her postoperative course was complicated by ileus requiring prolonged hospital stay and nasogastric tube decompression of her stomach. Not long after returning to home, she noticed progressive, tender mass in the left lower quadrant at her prior 12-mm site. On exam, she has a tender, partially reducible bulge. Her findings are consistent with incisional hernia. She is taken to the operating room today for laparoscopic reduction and repair. I have asked DANETTE Wallace, to assist with the procedure given the patient's multiple prior abdominal surgeries. She will run the laparoscope, assist in hernia repair. FINDINGS:  No fascial defect at the left lower quadrant site. Abdominal wall seroma identified with 1 mm opening which allowed decompression of fluid into the abdominal space. PROCEDURE:  The patient was identified as the correct patient in the preoperative holding area and informed consent was confirmed.   After answering the patient's remaining questions, she was taken to the operating room and placed on the operating room table in the supine position. Sequential compression devices were placed on both lower extremities. Following the uneventful initiation of general anesthesia, a Guallpa catheter was placed within her bladder, and she was carefully secured to the operating room table with safety strap in place. All potential pressure points were padded with eggcrate. Her arms were tucked to her side. Her abdomen was prepped and draped in usual sterile fashion. Final time-out was performed, and it was confirmed she had received intravenous antibiotics. A 5-mm trocar was inserted through a small left upper quadrant skin incision using an Optiview technique. After confirming intraperitoneal location of the trocar tip, insufflation with carbon dioxide gas was initiated. Once adequate working sites had been developed, the 5-mm, 30-degree laparoscope was inserted. No signs of trocar injury were present. Chronic left upper quadrant adhesions were identified following prior left kidney surgery. A loop of bowel was adherent to the anterior abdominal wall in the right mid abdominal area. A 5-mm trocar was inserted through a small right-sided skin incision using visual guidance with the laparoscope. Viewing the left abdomen from the second trocar, the area of the left lower quadrant was visualized. No signs of fascial defect were present. A 1-mm opening at the superior aspect of the peritoneum at the trocar site was identified, and on palpation of the skin and scar, evacuation of a seroma occurred, into the peritoneal space. No signs of fascial defect were present. No other pathology was identified. After confirming adequate hemostasis, pneumoperitoneum was stopped, and gas was evacuated from the seroma cavity using needle aspiration using visual guidance with the laparoscope to rule out visceral injury.   Once the seroma cavity had been decompressed, the remaining pneumoperitoneum was released, and the 2 trocars were removed. All wounds were infiltrated with 0.5% Marcaine without epinephrine. All skin edges were reapproximated with a combination of subcuticular 4-0 Monocryl suture and Dermabond. The aspiration site was closed with Dermabond, and a pressure dressing was placed at the seroma site. The patient tolerated the procedure well. She was extubated in the operating room and transported to the recovery area in stable condition. The attending surgeon, Dr. Tiffanie Kelsey, was scrubbed and present for the entire procedure.         Annabella Horton MD      [id number]  D:  12/14/2020 19:17  T:  12/15/2020 3:11  JOB #:  [id number]

## 2021-01-04 ENCOUNTER — OFFICE VISIT (OUTPATIENT)
Dept: SURGERY | Age: 71
End: 2021-01-04
Payer: MEDICARE

## 2021-01-04 VITALS
HEART RATE: 81 BPM | RESPIRATION RATE: 19 BRPM | TEMPERATURE: 99.3 F | OXYGEN SATURATION: 95 % | WEIGHT: 146.6 LBS | DIASTOLIC BLOOD PRESSURE: 72 MMHG | BODY MASS INDEX: 24.43 KG/M2 | SYSTOLIC BLOOD PRESSURE: 107 MMHG | HEIGHT: 65 IN

## 2021-01-04 DIAGNOSIS — R10.30 LOWER ABDOMINAL PAIN: ICD-10-CM

## 2021-01-04 DIAGNOSIS — Z09 FOLLOW-UP EXAMINATION AFTER ABDOMINAL SURGERY: Primary | ICD-10-CM

## 2021-01-04 PROCEDURE — 99024 POSTOP FOLLOW-UP VISIT: CPT | Performed by: NURSE PRACTITIONER

## 2021-01-04 NOTE — PROGRESS NOTES
1. Have you been to the ER, urgent care clinic since your last visit? Hospitalized since your last visit? No    2. Have you seen or consulted any other health care providers outside of the [address] [place] [address] since your last visit? Include any pap smears or colon screening. No    Patient is on continuous O2 @2lpm. Patient stated she doesn't wear it all the time, but she is supposed too. She doesn't have it on now, but she has the tank with her. O2 WNL, however she appears winded from the short walk from the triage room. Patient stated she is ok without it she just needs to sit for a few minutes.

## 2021-01-04 NOTE — PATIENT INSTRUCTIONS
Take the Bentyl 1-2 tabs every 8 hours as needed for abdominal cramps / pain Ok to use the gas -x type medication with meals and as needed If pain worsens and or you have vomiting, worsening bloating please follow up and will plan on getting a CT scan to evaluate further

## 2021-01-05 NOTE — PROGRESS NOTES
Chief Complaint   Patient presents with    Post OP Follow Up     3 weeks s/p Diagnostic lap     Wound Check     Daphne Mix is almost 2 months status post right colectomy for an unresectable polyp. Postoperatively was thought to have a left lower quadrant hernia at a trocar site however it ended up being an abdominal wall seroma. She had a diagnostic laparoscopy and drainage of the abdominal wall seroma about 2 weeks ago. She continues to complain of lower abdominal pain, mostly across the lower abdomen and says it usually goes from the right side to the left side like a bad ache. Sometimes splinting the area is helpful in laying on her back where the fetal position gives her relief.   She has associated nausea without vomiting  Associated bloating usually at the end of the day  A lot of gas  Reports her bowels are moving most days and she denies black or bloody stools, no melena  She is gained a pound although does not have much of an appetite  The pain seems to come in waves and she says she can make it through  She has Bentyl at home but has not tried it yet until she talk to someone about it  She has Zofran at home for nausea but also has not taken it  She associates that Zofran with chemotherapy  She said no fever, chills, chest pain  She has her baseline shortness of breath and is oxygen dependent  Visit Vitals  /72 (BP 1 Location: Left arm, BP Patient Position: Sitting)   Pulse 81   Temp 99.3 °F (37.4 °C) (Oral)   Resp 19   Ht 5' 5\" (1.651 m)   Wt 146 lb 9.6 oz (66.5 kg)   SpO2 95%   BMI 24.40 kg/m²     She is ambulating independently and oxygen is on via nasal cannula  She is intermittently splinting her lower abdomen  Speech is clear and she is alert and oriented  Chest is clear and no wheezing or rales  Heart is regular rate and rhythm  Abdomen is soft, incisions are well-healed and without erythema or induration  Hyperactive bowel sounds and some vague tenderness right lower quadrant, no rebound and no guarding  Extremities are without edema and she is ambulating independently    ICD-10-CM ICD-9-CM    1. Follow-up examination after abdominal surgery  Z09 V67.09    2. Lower abdominal pain  R10.30 789.09      Almost 2-month status post right colectomy in about 2 weeks status post diagnostic lap with drainage of an abdominal wall seroma  No hernia  Continues with crampy lower abdominal pain and gas with associated nausea  She is agreeable to trying the Bentyl and she has plenty at home  She is to use the Zofran every 6 hours as needed for nausea  Reviewed diet and advised on more of a bland low fiber type diet  If she has worsening pain and/or associated vomiting she needs to be evaluated promptly. She says she is not coming to the ER. We agreed to do a follow-up call on Friday just to check in and see how she is doing. If situation is not improved would recommend proceeding with some imaging with concern for intermittent bowel obstruction. Sparkle Christine verbalized understanding and questions were answered to the best of my knowledge and ability. Medication and pain educational materials were provided.

## 2021-01-07 ENCOUNTER — TELEPHONE (OUTPATIENT)
Dept: SURGERY | Age: 71
End: 2021-01-07

## 2021-01-07 NOTE — TELEPHONE ENCOUNTER
Called to check in with patient. She is currently in the ER with her spouse. She is feeling better with the Bently and Gas x. Less abdominal pain and tolerating diet. Bowels are working. Encouraged her to use the medication if needed and follow up as needed. She appreciated the call.

## 2022-03-18 ENCOUNTER — HOSPITAL ENCOUNTER (OUTPATIENT)
Dept: PREADMISSION TESTING | Age: 72
Discharge: HOME | End: 2022-03-18
Attending: INTERNAL MEDICINE
Payer: MEDICARE

## 2022-03-18 ENCOUNTER — TRANSCRIBE ORDER (OUTPATIENT)
Dept: REGISTRATION | Age: 72
End: 2022-03-18

## 2022-03-18 DIAGNOSIS — U07.1 COVID-19: ICD-10-CM

## 2022-03-18 DIAGNOSIS — U07.1 COVID-19: Primary | ICD-10-CM

## 2022-03-18 PROBLEM — K43.2 INCISIONAL HERNIA, WITHOUT OBSTRUCTION OR GANGRENE: Status: ACTIVE | Noted: 2020-12-11

## 2022-03-18 PROBLEM — C34.12 MALIGNANT NEOPLASM OF UPPER LOBE OF LEFT LUNG (HCC): Status: ACTIVE | Noted: 2020-11-04

## 2022-03-18 PROCEDURE — U0005 INFEC AGEN DETEC AMPLI PROBE: HCPCS

## 2022-03-19 PROBLEM — K21.9 GASTROESOPHAGEAL REFLUX DISEASE WITHOUT ESOPHAGITIS: Status: ACTIVE | Noted: 2020-11-04

## 2022-03-19 PROBLEM — I10 ESSENTIAL HYPERTENSION: Status: ACTIVE | Noted: 2020-11-04

## 2022-03-19 PROBLEM — J41.0 SIMPLE CHRONIC BRONCHITIS (HCC): Status: ACTIVE | Noted: 2020-11-04

## 2022-03-19 PROBLEM — K63.89 MASS OF COLON: Status: ACTIVE | Noted: 2020-11-04

## 2022-03-19 LAB
SARS-COV-2, XPLCVT: NOT DETECTED
SOURCE, COVRS: NORMAL

## 2022-03-20 PROBLEM — E11.9 CONTROLLED TYPE 2 DIABETES MELLITUS WITHOUT COMPLICATION, WITHOUT LONG-TERM CURRENT USE OF INSULIN (HCC): Status: ACTIVE | Noted: 2020-11-04

## 2022-03-20 PROBLEM — K63.89 MASS OF HEPATIC FLEXURE OF COLON: Status: ACTIVE | Noted: 2020-11-16

## 2022-03-23 ENCOUNTER — ANESTHESIA (OUTPATIENT)
Dept: ENDOSCOPY | Age: 72
End: 2022-03-23
Payer: MEDICARE

## 2022-03-23 ENCOUNTER — HOSPITAL ENCOUNTER (OUTPATIENT)
Age: 72
Setting detail: OUTPATIENT SURGERY
Discharge: HOME | End: 2022-03-23
Attending: INTERNAL MEDICINE | Admitting: INTERNAL MEDICINE
Payer: MEDICARE

## 2022-03-23 ENCOUNTER — ANESTHESIA EVENT (OUTPATIENT)
Dept: ENDOSCOPY | Age: 72
End: 2022-03-23
Payer: MEDICARE

## 2022-03-23 VITALS
RESPIRATION RATE: 15 BRPM | TEMPERATURE: 98 F | HEIGHT: 64 IN | HEART RATE: 83 BPM | WEIGHT: 157 LBS | DIASTOLIC BLOOD PRESSURE: 77 MMHG | BODY MASS INDEX: 26.8 KG/M2 | OXYGEN SATURATION: 95 % | SYSTOLIC BLOOD PRESSURE: 97 MMHG

## 2022-03-23 PROCEDURE — 76060000032 HC ANESTHESIA 0.5 TO 1 HR: Performed by: INTERNAL MEDICINE

## 2022-03-23 PROCEDURE — 74011000250 HC RX REV CODE- 250: Performed by: NURSE ANESTHETIST, CERTIFIED REGISTERED

## 2022-03-23 PROCEDURE — 76040000007: Performed by: INTERNAL MEDICINE

## 2022-03-23 PROCEDURE — 77030021593 HC FCPS BIOP ENDOSC BSC -A: Performed by: INTERNAL MEDICINE

## 2022-03-23 PROCEDURE — 2709999900 HC NON-CHARGEABLE SUPPLY: Performed by: INTERNAL MEDICINE

## 2022-03-23 PROCEDURE — 74011250636 HC RX REV CODE- 250/636: Performed by: NURSE ANESTHETIST, CERTIFIED REGISTERED

## 2022-03-23 PROCEDURE — 88305 TISSUE EXAM BY PATHOLOGIST: CPT

## 2022-03-23 RX ORDER — MIDAZOLAM HYDROCHLORIDE 1 MG/ML
.25-5 INJECTION, SOLUTION INTRAMUSCULAR; INTRAVENOUS
Status: DISCONTINUED | OUTPATIENT
Start: 2022-03-23 | End: 2022-03-23 | Stop reason: HOSPADM

## 2022-03-23 RX ORDER — EPHEDRINE SULFATE/0.9% NACL/PF 50 MG/5 ML
SYRINGE (ML) INTRAVENOUS AS NEEDED
Status: DISCONTINUED | OUTPATIENT
Start: 2022-03-23 | End: 2022-03-23 | Stop reason: HOSPADM

## 2022-03-23 RX ORDER — SODIUM CHLORIDE 9 MG/ML
150 INJECTION, SOLUTION INTRAVENOUS CONTINUOUS
Status: DISCONTINUED | OUTPATIENT
Start: 2022-03-23 | End: 2022-03-23 | Stop reason: HOSPADM

## 2022-03-23 RX ORDER — FENTANYL CITRATE 50 UG/ML
200 INJECTION, SOLUTION INTRAMUSCULAR; INTRAVENOUS
Status: DISCONTINUED | OUTPATIENT
Start: 2022-03-23 | End: 2022-03-23 | Stop reason: HOSPADM

## 2022-03-23 RX ORDER — SODIUM CHLORIDE 0.9 % (FLUSH) 0.9 %
5-40 SYRINGE (ML) INJECTION AS NEEDED
Status: DISCONTINUED | OUTPATIENT
Start: 2022-03-23 | End: 2022-03-23 | Stop reason: HOSPADM

## 2022-03-23 RX ORDER — PROPOFOL 10 MG/ML
INJECTION, EMULSION INTRAVENOUS AS NEEDED
Status: DISCONTINUED | OUTPATIENT
Start: 2022-03-23 | End: 2022-03-23 | Stop reason: HOSPADM

## 2022-03-23 RX ORDER — SODIUM CHLORIDE 9 MG/ML
INJECTION, SOLUTION INTRAVENOUS
Status: DISCONTINUED | OUTPATIENT
Start: 2022-03-23 | End: 2022-03-23 | Stop reason: HOSPADM

## 2022-03-23 RX ORDER — ATROPINE SULFATE 0.1 MG/ML
0.5 INJECTION INTRAVENOUS
Status: DISCONTINUED | OUTPATIENT
Start: 2022-03-23 | End: 2022-03-23 | Stop reason: HOSPADM

## 2022-03-23 RX ORDER — EPINEPHRINE 0.1 MG/ML
1 INJECTION INTRACARDIAC; INTRAVENOUS
Status: DISCONTINUED | OUTPATIENT
Start: 2022-03-23 | End: 2022-03-23 | Stop reason: HOSPADM

## 2022-03-23 RX ORDER — SODIUM CHLORIDE 0.9 % (FLUSH) 0.9 %
5-40 SYRINGE (ML) INJECTION EVERY 8 HOURS
Status: DISCONTINUED | OUTPATIENT
Start: 2022-03-23 | End: 2022-03-23 | Stop reason: HOSPADM

## 2022-03-23 RX ORDER — DEXTROMETHORPHAN/PSEUDOEPHED 2.5-7.5/.8
1.2 DROPS ORAL
Status: DISCONTINUED | OUTPATIENT
Start: 2022-03-23 | End: 2022-03-23 | Stop reason: HOSPADM

## 2022-03-23 RX ORDER — ESOMEPRAZOLE MAGNESIUM 40 MG/1
CAPSULE, DELAYED RELEASE ORAL DAILY
COMMUNITY

## 2022-03-23 RX ADMIN — SODIUM CHLORIDE: 900 INJECTION, SOLUTION INTRAVENOUS at 15:56

## 2022-03-23 RX ADMIN — PROPOFOL 50 MG: 10 INJECTION, EMULSION INTRAVENOUS at 16:11

## 2022-03-23 RX ADMIN — PROPOFOL 50 MG: 10 INJECTION, EMULSION INTRAVENOUS at 16:02

## 2022-03-23 RX ADMIN — PROPOFOL 50 MG: 10 INJECTION, EMULSION INTRAVENOUS at 16:28

## 2022-03-23 RX ADMIN — PROPOFOL 50 MG: 10 INJECTION, EMULSION INTRAVENOUS at 16:23

## 2022-03-23 RX ADMIN — PROPOFOL 50 MG: 10 INJECTION, EMULSION INTRAVENOUS at 16:14

## 2022-03-23 RX ADMIN — PROPOFOL 50 MG: 10 INJECTION, EMULSION INTRAVENOUS at 16:19

## 2022-03-23 RX ADMIN — PROPOFOL 50 MG: 10 INJECTION, EMULSION INTRAVENOUS at 16:03

## 2022-03-23 RX ADMIN — PROPOFOL 50 MG: 10 INJECTION, EMULSION INTRAVENOUS at 16:06

## 2022-03-23 RX ADMIN — PROPOFOL 50 MG: 10 INJECTION, EMULSION INTRAVENOUS at 16:04

## 2022-03-23 RX ADMIN — Medication 5 MG: at 16:28

## 2022-03-23 RX ADMIN — Medication 10 MG: at 16:13

## 2022-03-23 RX ADMIN — Medication 10 MG: at 16:16

## 2022-03-23 RX ADMIN — Medication 5 MG: at 16:24

## 2022-03-23 RX ADMIN — PROPOFOL 50 MG: 10 INJECTION, EMULSION INTRAVENOUS at 16:08

## 2022-03-23 RX ADMIN — SODIUM CHLORIDE: 900 INJECTION, SOLUTION INTRAVENOUS at 16:32

## 2022-03-23 NOTE — PROCEDURES
Zane Ramirez [facility] 912 Neil Gamez M.D.  [place] Reeves, [address] [facility]  [phone]               Colonoscopy Procedure Note    NAME: Smiley West  :  1950  MRN:  [id number]    Indications: Lower abdominal pain    : Cris Wood MD    Referring Provider:  Rainer Graff NP    Staff: Endoscopy [facility] Servando: Markel Jo  Endoscopy RN-1: Sisi Vences RN    Prosthetic devices, grafts, tissues, transplant, or devices implanted: none    Medicines:  MAC anesthesia      Procedure Details:  After informed consent was obtained with all risks and benefits of the procedure explained and preprocedure exam completed, the patient was placed in the left lateral decubitus position. Universal protocol for patient identification was performed and documented in the nursing notes. Throughout the procedure, the patient's blood pressure was monitored at least every five minutes; pulse, and oxygen saturations were monitored continuously. All vital signs were documented in the nursing notes. A digital rectal exam was performed and was normal.  The Olympus videocolonoscope  was inserted in the rectum and carefully advanced to the terminal ileum. The colonoscope was slowly withdrawn with careful evaluation between folds. Retroflexion in the rectum was performed; findings and interventions are described below. Procedure start time, extent reached time/cecum time, and procedure end time are documented in the nursing notes. The quality of preparation was adequate. Findings:   1. R hemicolectomy-normal TI  2. 2 mm sessile polyp in the transverse colon s/p cold forceps polypectomy  3.  Small internal hemorrhoids    Interventions:    1 complete polypectomy were performed using cold biopsy forceps and the polyps were  retrieved    Specimens:   ID Type Source Tests Collected by Time Destination   1 : Biopsies Rule Out Celiac Disease Preservative Duodenum  Maru Graves MD 3/23/2022 1605 Pathology   2 : Biopsies Rule Out H. Pylori Preservative Gastric  Malena Mixon MD 3/23/2022 1609 Pathology   3 : Biopsies Preservative Esophagus, Mid  Malena Mixon MD 3/23/2022 1612 Pathology   4 : Colon Polyp Preservative Colon, Transverse  Malena Mixon MD 3/23/2022 1624 Pathology       EBL:  None. Complications:   No immediate complications     Impression:  -See post-procedure diagnoses. Recommendations:   -Repeat colonoscopy in 5 years. If < 10 years, reason:  above average risk patient    Resume normal medication(s). Signed by:  Shubham Haque MD          3/23/2022  4:44 PM

## 2022-03-23 NOTE — PROCEDURES
Zane Li [facility] 912 Zara Keene M.D.  [address] [place] [address], [address] [place] [address]  [phone]               Esophagogastroduodenoscopy (EGD) Procedure Note    NAME: Cher Worrell  :  1950  MRN:  [id number]    Indications:  Abdominal pain, epigastric     : Kwasi Nelson MD    Referring Provider:  Ned Karimi NP    Staff: Endoscopy [facility]: Susanne Jacobson  Endoscopy RN-1: Raymundo Mo RN    Prosthetic devices, grafts, tissues, transplant, or devices implanted: none    Medicine:  MAC anesthesia      Procedure Details:  After informed consent was obtained with all risks and benefits of the procedure explained and preprocedure exam completed, the patient was placed in the left lateral decubitus position. Universal protocol for patient identification was performed and documented in the nursing notes. Throughout the procedure, the patient's blood pressure was monitored at least every five minutes; pulse, and oxygen saturations were monitored continuously. All vital signs were documented in the nursing notes. The endoscope was inserted into the mouth and advanced under direct vision to second portion of the duodenum. A careful inspection was made as the gastroscope was withdrawn, including a retroflexed view of the proximal stomach; findings and interventions are described below.       Findings:   Esophagus: mild patchy erythema in the mid esophagus-no stricture s/p biopsies  Stomach: 2 cm hiatal hernia, rest of the stomach was normal s/p biopsies  Duodenum: normal s/p biopsies for celiac disease    Interventions:    biopsy of esophagus, stomach, and duodenum    Specimens:   ID Type Source Tests Collected by Time Destination   1 : Biopsies Rule Out Celiac Disease Preservative Duodenum  Sharyle Levans, MD 3/23/2022 1605 Pathology   2 : Biopsies Rule Out H. Pylori Preservative Gastric  Sharyle Levans, MD 3/23/2022 1609 Pathology   3 : Biopsies Preservative Ramos Yuan MD 3/23/2022 1612 Pathology   4 : Colon Polyp Preservative Colon, Transverse  Robe Leaks, MD 3/23/2022 1624 Pathology        EBL: None          Complications:     No immediate complications        Impression:  -See post-procedure diagnoses. Esophagus findings likely due to radiation. Recommendations:  -Await pathology. Signed by:  Catrina Schmid MD         3/23/2022 4:41 PM

## 2022-03-23 NOTE — ANESTHESIA PREPROCEDURE EVALUATION
Relevant Problems   RESPIRATORY SYSTEM   (+) Dyspnea   (+) Simple chronic bronchitis (HCC)      CARDIOVASCULAR   (+) Essential hypertension      GASTROINTESTINAL   (+) Gastroesophageal reflux disease without esophagitis      ENDOCRINE   (+) Controlled type 2 diabetes mellitus without complication, without long-term current use of insulin (HCC)      PERSONAL HX & FAMILY HX OF CANCER   (+) Breast cancer, stage 2 (HCC)   (+) Malignant neoplasm of upper lobe of left lung (HCC)       Anesthetic History     PONV          Review of Systems / Medical History  Patient summary reviewed, nursing notes reviewed and pertinent labs reviewed    Pulmonary    COPD      Smoker  Asthma     Comments:  On home O2   Neuro/Psych   Within defined limits           Cardiovascular    Hypertension              Exercise tolerance: <4 METS     GI/Hepatic/Renal     GERD           Endo/Other    Diabetes    Arthritis and cancer (Lung CA s/p L lobectomy and breast CA )     Other Findings   Comments: SLE         Physical Exam    Airway  Mallampati: II  TM Distance: > 6 cm  Neck ROM: normal range of motion   Mouth opening: Normal     Cardiovascular  Regular rate and rhythm,  S1 and S2 normal,  no murmur, click, rub, or gallop             Dental    Dentition: Full upper dentures, Lower partial plate and Poor dentition     Pulmonary  Breath sounds clear to auscultation               Abdominal  GI exam deferred       Other Findings            Anesthetic Plan    ASA: 3  Anesthesia type: MAC          Induction: Intravenous  Anesthetic plan and risks discussed with: Patient

## 2022-03-23 NOTE — DISCHARGE INSTRUCTIONS
Zane Marcus [address] Garcia Ramos M.D.  [address] [place] [address], [address] [place] [address] Pkw[address]  [phone]                      EGD/COLONOSCOPY [address]  [id number]  1950    DISCOMFORT:  Redness at IV site- apply warm compress to area; if redness or soreness persist- contact your physician  There may be a slight amount of blood passed from the rectum  Gaseous discomfort- walking, belching will help relieve any discomfort  You may not operate a vehicle for 12 hours  You may not  engage in an occupation involving machinery or appliances for rest of today  You may not  drink alcoholic beverages for at least 12 hours  Avoid making any critical decisions for at least 24 hour    DIET:   You may resume your normal diet, but some patients find that heavy or large  meals may lead to indigestion or vomiting. I suggest a light meal as first food  Intake. I recommend a whole food, plant-based diet for your overall health. ACTIVITY:  You may resume your normal daily activities. It is recommended that you spend the remainder of the day resting - avoid any strenuous activity. CALL M.D. ANY SIGN OF   Increasing pain, nausea, vomiting  Abdominal distension (swelling)  New increased bleeding (oral or rectal)  Fever (chills)  Pain in chest area  Bloody discharge from nose or mouth  Shortness of breath    Additional Instructions:   Call Dr. Garcia Ramos if any questions or problems at [phone]   You should receive the biopsy results by phone or mail within 3 weeks, if not, call my office for the results. Should have a repeat colonoscopy in 5 years. EGD showed mild radiation change in the esophagus, small hiatal hernia, rest was normal. Biopsies taken. Colonoscopy showed one small polyp removed and small internal hemorrhoids. Learning About Coronavirus [phone])  Coronavirus [phone]): Overview  What is coronavirus (COVID-19)?   The coronavirus disease (COVID-19) is caused by a virus. It is an illness that was first found in [place], [place], in December 2019. It has since spread worldwide. The virus can cause fever, cough, and trouble breathing. In severe cases, it can cause pneumonia and make it hard to breathe without help. It can cause death. Coronaviruses are a large group of viruses. They cause the common cold. They also cause more serious illnesses like Middle East respiratory syndrome (MERS) and severe acute respiratory syndrome (SARS). COVID-19 is caused by a novel coronavirus. That means it's a new type that has not been seen in people before. This virus spreads person-to-person through droplets from coughing and sneezing. It can also spread when you are close to someone who is infected. And it can spread when you touch something that has the virus on it, such as a doorknob or a tabletop. What can you do to protect yourself from coronavirus (COVID-19)? The best way to protect yourself from getting sick is to:  · Avoid areas where there is an outbreak. · Avoid contact with people who may be infected. · Wash your hands often with soap or alcohol-based hand sanitizers. · Avoid crowds and try to stay at least 6 feet away from other people. · Wash your hands often, especially after you cough or sneeze. Use soap and water, and scrub for at least 20 seconds. If soap and water aren't available, use an alcohol-based hand . · Avoid touching your mouth, nose, and eyes. What can you do to avoid spreading the virus to others? To help avoid spreading the virus to others:  · Cover your mouth with a tissue when you cough or sneeze. Then throw the tissue in the trash. · Use a disinfectant to clean things that you touch often. · Stay home if you are sick or have been exposed to the virus. Don't go to school, work, or public areas. And don't use public transportation. · If you are sick:  ? Leave your home only if you need to get medical care.  But call the doctor's office first so they know you're coming. And wear a face mask, if you have one.  ? If you have a face mask, wear it whenever you're around other people. It can help stop the spread of the virus when you cough or sneeze. ? Clean and disinfect your home every day. Use household  and disinfectant wipes or sprays. Take special care to clean things that you grab with your hands. These include doorknobs, remote controls, phones, and handles on your refrigerator and microwave. And don't forget countertops, tabletops, bathrooms, and computer keyboards. When to call for help  Call 911 anytime you think you may need emergency care. For example, call if:  · You have severe trouble breathing. (You can't talk at all.)  · You have constant chest pain or pressure. · You are severely dizzy or lightheaded. · You are confused or can't think clearly. · Your face and lips have a blue color. · You pass out (lose consciousness) or are very hard to wake up. Call your doctor now if you develop symptoms such as:  · Shortness of breath. · Fever. · Cough. If you need to get care, call ahead to the doctor's office for instructions before you go. Make sure you wear a face mask, if you have one, to prevent exposing other people to the virus. Where can you get the latest information? The following health organizations are tracking and studying this virus. Their websites contain the most up-to-date information. Edwige Rima also learn what to do if you think you may have been exposed to the virus. · U.S. Centers for Disease Control and Prevention (CDC): The CDC provides updated news about the disease and travel advice. The website also tells you how to prevent the spread of infection. www.cdc.gov  · World Health Organization [facility]): WHO offers information about the virus outbreaks. WHO also has travel advice. www.who.int  Current as of: April 1, 2020               Content Version: 12.4  © 6810-8125 [employer].    Care instructions adapted under license by your healthcare professional. If you have questions about a medical condition or this instruction, always ask your healthcare professional. [place] [address] any warranty or liability for your use of this information.

## 2022-03-23 NOTE — H&P
[address]  [address], [address]          Pre-procedure History and Physical       NAME:  Irvin Oneal   :   1950   MRN:   [id number]     CHIEF COMPLAINT/HPI: epigastric pain, lower abdominal pain    PMH:  Past Medical History:   Diagnosis Date    Arthritis     OSTEO-ALL OVER    Asthma     Breast cancer, stage 2 ([facility] 2011    RIGHT    Cancer ([facility] 2004    BREAST CA left no sticks/bp in left arm    Chronic fatigue 2011    Chronic obstructive pulmonary disease (HCC)     Chronic pain     JOINT PAIN AND LEFT CHEST PAIN    Chronic pain     possible fibromyalgia    Dyspnea 2011    Emphysema lung (HCC)     GERD (gastroesophageal reflux disease)     Hematuria     High cholesterol     Lung cancer (HCC)     Lupus ([facility] 2011    Nausea & vomiting     Neuropathy 2011    PMR (polymyalgia rheumatica) (HCC)     S/P chemotherapy, time since greater than 12 weeks     S/P radiation therapy > 12 wks ago     Unspecified adverse effect of anesthesia     SEVERE HYPERTENSION DURING ORAL SURGERY    Use of letrozole ([facility])     Vitamin D deficiency        PSH:  Past Surgical History:   Procedure Laterality Date    COLONOSCOPY N/A 2020    COLONOSCOPY   :- performed by Andrez Cade MD at [place] [address]. N/A 10/15/2020    COLONOSCOPY with EMR performed by Laurie Jean-Baptiste MD at [facility] ENDOSCOPY    HX AMPUTATION Right     RIGHT INDEX FINGER    HX APPENDECTOMY      HX CHOLECYSTECTOMY      HX GI      COLONSOCPY/EGD    HX MASTECTOMY Bilateral     LEFT AND LYMPH NODES    HX MODIFIED RADICAL MASTECTOMY  04    left, w/ sentinel, Dr. Moreno Organ HX ORTHOPAEDIC Right     RT ANKLE FX/HARDWARE AND REMOVAL HARDWARE    HX RENAL BIOPSY      HX TOTAL COLECTOMY Right 2020    Laparoscopic right colectomy by Dr. Iram Cheema.     HX UROLOGICAL      CYSTO/STENTS    GA CHEST SURGERY PROCEDURE UNLISTED Left  LEFT LOBECTOMY    [place] LAP,DIAGNOSTIC ABDOMEN  2020    Dr. Cecy Constantino  928-04    left axilla       Allergies: Allergies   Allergen Reactions    Latex Other (comments)    Codeine Unknown (comments)    Demerol [Meperidine] Unknown (comments)    Januvia [Sitagliptin] Other (comments)     Severe chest pain    Phenergan [Promethazine] Anxiety    Talwin Compound Unknown (comments)       Home Medications:  Prior to Admission Medications   Prescriptions Last Dose Informant Patient Reported? Taking? Oxygen   Yes No   Si.5 liters at night and 2 liters prn during the day   PROAIR HFA 90 mcg/Actuation inhaler 3/23/2022 at Unknown time  Yes Yes   albuterol-ipratropium (DUONEB) 0.5 mg-3 mg(2.5 mg base)/3 mL nebulizer solution 3/23/2022 at Unknown time  Yes Yes   Sig: 3 mL by Nebulization route two (2) times daily as needed. budesonide/formoterol fumarate (SYMBICORT IN) 3/23/2022 at Unknown time  Yes Yes   Sig: Take  by inhalation. escitalopram oxalate (LEXAPRO) 10 mg tablet 3/22/2022 at Unknown time  Yes Yes   Sig: Take 10 mg by mouth daily. esomeprazole (NexIUM) 40 mg capsule 3/22/2022 at Unknown time  Yes Yes   Sig: Take  by mouth daily. gabapentin (NEURONTIN) 300 mg capsule 3/22/2022 at Unknown time  Yes Yes   Sig: Take 300 mg by mouth nightly. guaiFENesin (ORGANIDIN) 400 mg tablet 3/22/2022 at Unknown time  Yes Yes   Sig: Take  by mouth every six (6) hours as needed for Congestion. lovastatin (MEVACOR) 10 mg tablet Not Taking at Unknown time Self Yes No   Sig: Take 10 mg by mouth nightly. Patient not taking: Reported on 3/23/2022   omeprazole (PRILOSEC) 40 mg capsule Not Taking at Unknown time  Yes No   Sig: Take 40 mg by mouth nightly.    Patient not taking: Reported on 3/23/2022   ondansetron (ZOFRAN ODT) 4 mg disintegrating tablet   No No   Sig: Take 1 Tab by mouth every eight (8) hours as needed for Nausea or Vomiting.   sodium chloride (SALINE MIST NA) 3/22/2022 at Unknown time  Yes Yes   Sig: by Nasal route daily as needed. umeclidinium bromide (INCRUSE ELLIPTA IN)   Yes No   Sig: Take  by inhalation. Facility-Administered Medications: None       Hospital Medications:  Current Facility-Administered Medications   Medication Dose Route Frequency    0.9% sodium chloride infusion  150 mL/hr IntraVENous CONTINUOUS    sodium chloride (NS) flush 5-40 mL  5-40 mL IntraVENous Q8H    sodium chloride (NS) flush 5-40 mL  5-40 mL IntraVENous PRN    midazolam (VERSED) injection 0.25-5 mg  0.25-5 mg IntraVENous Multiple    fentaNYL citrate (PF) injection 200 mcg  200 mcg IntraVENous Multiple    simethicone (MYLICON) 09BR/5.7YD oral drops 80 mg  1.2 mL Oral Multiple    atropine injection 0.5 mg  0.5 mg IntraVENous ONCE PRN    EPINEPHrine (ADRENALIN) 0.1 mg/mL syringe 1 mg  1 mg Endoscopically ONCE PRN       Family History:  Family History   Problem Relation Age of Onset    Alcohol abuse Mother     Cancer Mother         LUNG CA, BREAST CA    Lung Disease Mother     Alcohol abuse Father     Cancer Father         STOMACH CA    No Known Problems Sister     No Known Problems Brother     Anesth Problems Neg Hx        Social History:  Social History     Tobacco Use    Smoking status: Current Every Day Smoker     Packs/day: 1.50     Years: 34.00     Pack years: 51.00    Smokeless tobacco: Never Used   Substance Use Topics    Alcohol use: No       The patient was counseled at length about the risks of vaishali Covid-19 in the jackie-operative and post-operative states including the recovery window of their procedure. The patient was made aware that vaishali Covid-19 after a surgical procedure may worsen their prognosis for recovering from the virus and lend to a higher morbidity and or mortality risk. The patient was given the options of postponing their procedure. All of the risks, benefits, and alternatives were discussed.  The patient does  wish to proceed with the procedure. PHYSICAL EXAM PRIOR TO SEDATION:  General: Alert, in no acute distress    Lungs:            CTA bilaterally  Heart:  Normal S1, S2    Abdomen: Soft, Non distended, Non tender. Normoactive bowel sounds. Assessment:   Stable for sedation administration.   Date of last colonoscopy: 2020, Polyps  Yes    Plan:     · Endoscopic procedure with sedation     Signed By: Araceli Riojas MD     3/23/2022  3:52 PM

## 2022-03-24 NOTE — ANESTHESIA POSTPROCEDURE EVALUATION
Procedure(s):  ESOPHAGOGASTRODUODENOSCOPY (EGD)    :-  COLONOSCOPY  ESOPHAGOGASTRODUODENAL (EGD) BIOPSY  ENDOSCOPIC POLYPECTOMY. MAC    Anesthesia Post Evaluation        Patient participation: complete - patient participated  Level of consciousness: awake  Pain management: adequate  Airway patency: patent  Anesthetic complications: no  Cardiovascular status: hemodynamically stable  Respiratory status: acceptable  Hydration status: acceptable  Comments: The patient is ready for PACU discharge.   Melly Yeager DO                   Post anesthesia nausea and vomiting:  controlled      INITIAL Post-op Vital signs:   Vitals Value Taken Time   BP 97/77 03/23/22 1701   Temp 36.7 °C (98 °F) 03/23/22 1635   Pulse 83 03/23/22 1700   Resp 15 03/23/22 1700   SpO2 95 % 03/23/22 1700

## 2022-04-26 ENCOUNTER — TRANSCRIBE ORDER (OUTPATIENT)
Dept: SCHEDULING | Age: 72
End: 2022-04-26

## 2022-04-26 DIAGNOSIS — K31.84 GASTROPARESIS: Primary | ICD-10-CM

## 2022-04-26 DIAGNOSIS — R05.3 CHRONIC COUGH: ICD-10-CM

## 2022-04-26 DIAGNOSIS — R10.30 LOWER ABDOMINAL PAIN: ICD-10-CM

## (undated) DEVICE — TROCAR SITE CLOSURE DEVICE: Brand: ENDO CLOSE

## (undated) DEVICE — TUBING, SUCTION, 1/4" X 12', STRAIGHT: Brand: MEDLINE

## (undated) DEVICE — TROCAR: Brand: KII® OPTICAL ACCESS SYSTEM

## (undated) DEVICE — TOTAL TRAY, 16FR 10ML SIL FOLEY, URN: Brand: MEDLINE

## (undated) DEVICE — STRAP,POSITIONING,KNEE/BODY,FOAM,4X60": Brand: MEDLINE

## (undated) DEVICE — SNARE ENDOSCP L240CM LOOP W27MM SHTH DIA2.4MM WRK CHN 2.8MM

## (undated) DEVICE — CLICKLINE SCISSORS INSERT: Brand: CLICKLINE

## (undated) DEVICE — STAIN INDIA INK IN NACL 10ML --

## (undated) DEVICE — STERILE POLYISOPRENE POWDER-FREE SURGICAL GLOVES WITH EMOLLIENT COATING: Brand: PROTEXIS

## (undated) DEVICE — NEEDLE HYPO 22GA L1.5IN BLK S STL HUB POLYPR SHLD REG BVL

## (undated) DEVICE — GEL SUBMUCOSAL LIFT AGNT -- ORISE

## (undated) DEVICE — LAPAROSCOPIC TROCAR SLEEVE/SINGLE USE: Brand: KII® OPTICAL ACCESS SYSTEM

## (undated) DEVICE — PREP SKN CHLRAPRP APL 26ML STR --

## (undated) DEVICE — FILTER SMK EVAC FLO CLR MEGADYNE

## (undated) DEVICE — COLON CLOSING PACK: Brand: MEDLINE INDUSTRIES, INC.

## (undated) DEVICE — SNARE ENDOSCP M L240CM W27MM SHTH DIA2.4MM CHN 2.8MM OVL

## (undated) DEVICE — GAUZE,SPONGE,2"X2",8PLY,STERILE,LF,2'S: Brand: MEDLINE

## (undated) DEVICE — SUTURE PERMAHAND SZ 2-0 L18IN NONABSORBABLE BLK L26MM SH C012D

## (undated) DEVICE — GARMENT,MEDLINE,DVT,INT,CALF,MED, GEN2: Brand: MEDLINE

## (undated) DEVICE — TROCAR: Brand: KII® SLEEVE

## (undated) DEVICE — SUTURE PDS II SZ 1 L96IN ABSRB VLT TP-1 L65MM 1/2 CIR Z880G

## (undated) DEVICE — STERILE-Z MAYO STAND COVERS CLEAR POLYETHYLENE STERILE UNIVERSAL FIT 20 PER CASE: Brand: STERILE-Z

## (undated) DEVICE — WORKING LENGTH 235CM, WORKING CHANNEL 2.8MM: Brand: RESOLUTION 360 CLIP

## (undated) DEVICE — Device

## (undated) DEVICE — SUTURE SZ 0 27IN 5/8 CIR UR-6  TAPER PT VIOLET ABSRB VICRYL J603H

## (undated) DEVICE — REM POLYHESIVE ADULT PATIENT RETURN ELECTRODE: Brand: VALLEYLAB

## (undated) DEVICE — POWER SHELL: Brand: SIGNIA

## (undated) DEVICE — FCPS RAD JAW 4LC 240CM W/NDL -- BX/20 RADIAL JAW 4

## (undated) DEVICE — SURGICAL PROCEDURE KIT GEN LAPAROSCOPY LF

## (undated) DEVICE — ACCESS PLATFORM FOR MINIMALLY INVASIVE SURGERY.: Brand: GELPORT® LAPAROSCOPIC  SYSTEM

## (undated) DEVICE — 4-PORT MANIFOLD: Brand: NEPTUNE 2

## (undated) DEVICE — TUBING HYDR IRR --

## (undated) DEVICE — DERMABOND SKIN ADH 0.7ML -- DERMABOND ADVANCED 12/BX

## (undated) DEVICE — SKIN TEMPERATURE SENSOR: Brand: DEROYAL

## (undated) DEVICE — TRAP SURG QUAD PARABOLA SLOT DSGN SFTY SCRN TRAPEASE

## (undated) DEVICE — DRAPE,UTILTY,TAPE,15X26, 4EA/PK: Brand: MEDLINE

## (undated) DEVICE — NEEDLE SCLERO 23GA L4MM CATH L240CM CNTRST SHTH DIA1.8MM

## (undated) DEVICE — SUTURE MCRYL SZ 4-0 L27IN ABSRB UD L19MM PS-2 1/2 CIR PRIM Y426H

## (undated) DEVICE — SPONGE LAP 18X18IN STRL -- 5/PK

## (undated) DEVICE — SOLUTION IRRIGATION NACL 0.9% 1000 ML FLX CONTAINER

## (undated) DEVICE — SUTURE VCRL SZ 3-0 L27IN ABSRB VLT L26MM SH 1/2 CIR J316H

## (undated) DEVICE — ARTICULATING RELOAD WITH TRI-STAPLE TECHNOLOGY: Brand: ENDO GIA

## (undated) DEVICE — VISUALIZATION SYSTEM: Brand: CLEARIFY

## (undated) DEVICE — MARYLAND JAW LAPAROSCOPIC SEALER/DIVIDER COATED: Brand: LIGASURE

## (undated) DEVICE — SUTURE GORTX SZ 1 L36IN NONABSORBABLE L26MM THX-26 1/2 CIR 0N05B

## (undated) DEVICE — FORCEPS BX L240CM JAW DIA2.8MM L CAP W/ NDL MIC MESH TOOTH

## (undated) DEVICE — 3M™ TEGADERM™ TRANSPARENT FILM DRESSING FRAME STYLE, 1624W, 2-3/8 IN X 2-3/4 IN (6 CM X 7 CM), 100/CT 4CT/CASE: Brand: 3M™ TEGADERM™

## (undated) DEVICE — WRAP SURG W1.31XL1.34M CARD FOR PT 165-172CM THERMOWRP

## (undated) DEVICE — INFECTION CONTROL KIT SYS

## (undated) DEVICE — Device: Brand: SINGLE USE INJECTOR NM600/610